# Patient Record
Sex: FEMALE | Race: WHITE | Employment: FULL TIME | URBAN - NONMETROPOLITAN AREA
[De-identification: names, ages, dates, MRNs, and addresses within clinical notes are randomized per-mention and may not be internally consistent; named-entity substitution may affect disease eponyms.]

---

## 2018-04-12 ENCOUNTER — HOSPITAL ENCOUNTER (OUTPATIENT)
Dept: WOMENS IMAGING | Age: 48
Discharge: HOME | End: 2018-04-12
Payer: COMMERCIAL

## 2018-04-12 DIAGNOSIS — Z12.31 VISIT FOR SCREENING MAMMOGRAM: ICD-10-CM

## 2018-04-12 PROCEDURE — 77063 BREAST TOMOSYNTHESIS BI: CPT

## 2018-07-12 ENCOUNTER — OFFICE VISIT (OUTPATIENT)
Dept: INTERNAL MEDICINE CLINIC | Age: 48
End: 2018-07-12
Payer: COMMERCIAL

## 2018-07-12 VITALS
HEIGHT: 67 IN | BODY MASS INDEX: 45.83 KG/M2 | SYSTOLIC BLOOD PRESSURE: 118 MMHG | DIASTOLIC BLOOD PRESSURE: 74 MMHG | HEART RATE: 76 BPM | WEIGHT: 292 LBS

## 2018-07-12 DIAGNOSIS — E78.5 HYPERLIPIDEMIA, UNSPECIFIED HYPERLIPIDEMIA TYPE: ICD-10-CM

## 2018-07-12 DIAGNOSIS — E11.9 TYPE 2 DIABETES MELLITUS WITHOUT COMPLICATION, WITHOUT LONG-TERM CURRENT USE OF INSULIN (HCC): Primary | ICD-10-CM

## 2018-07-12 DIAGNOSIS — E03.8 OTHER SPECIFIED HYPOTHYROIDISM: ICD-10-CM

## 2018-07-12 DIAGNOSIS — E66.01 MORBID OBESITY WITH BMI OF 45.0-49.9, ADULT (HCC): ICD-10-CM

## 2018-07-12 DIAGNOSIS — K21.9 GASTROESOPHAGEAL REFLUX DISEASE, ESOPHAGITIS PRESENCE NOT SPECIFIED: ICD-10-CM

## 2018-07-12 DIAGNOSIS — G47.33 OBSTRUCTIVE SLEEP APNEA: ICD-10-CM

## 2018-07-12 PROCEDURE — 99204 OFFICE O/P NEW MOD 45 MIN: CPT | Performed by: INTERNAL MEDICINE

## 2018-07-12 RX ORDER — TOPIRAMATE 50 MG/1
50 TABLET, FILM COATED ORAL NIGHTLY
COMMUNITY
End: 2018-10-04 | Stop reason: SDUPTHER

## 2018-07-12 RX ORDER — CYCLOBENZAPRINE HCL 10 MG
10 TABLET ORAL 3 TIMES DAILY PRN
COMMUNITY
End: 2019-04-02

## 2018-07-12 RX ORDER — DOCUSATE SODIUM 100 MG/1
100 CAPSULE, LIQUID FILLED ORAL DAILY
COMMUNITY
End: 2019-01-03

## 2018-07-12 RX ORDER — GLIPIZIDE 5 MG/1
5 TABLET ORAL
Qty: 180 TABLET | Refills: 1 | Status: CANCELLED | OUTPATIENT
Start: 2018-07-12

## 2018-07-12 RX ORDER — LEVOTHYROXINE SODIUM 0.15 MG/1
150 TABLET ORAL DAILY
COMMUNITY
End: 2018-09-10 | Stop reason: SDUPTHER

## 2018-07-12 ASSESSMENT — PATIENT HEALTH QUESTIONNAIRE - PHQ9
1. LITTLE INTEREST OR PLEASURE IN DOING THINGS: 0
SUM OF ALL RESPONSES TO PHQ QUESTIONS 1-9: 0
2. FEELING DOWN, DEPRESSED OR HOPELESS: 0
SUM OF ALL RESPONSES TO PHQ9 QUESTIONS 1 & 2: 0

## 2018-07-18 ENCOUNTER — TELEPHONE (OUTPATIENT)
Dept: INTERNAL MEDICINE CLINIC | Age: 48
End: 2018-07-18

## 2018-07-18 NOTE — TELEPHONE ENCOUNTER
Pt called saying she is not feeling well since taking metformin. Has terrible nausea and diarrhea. She travels for her job. Wants to go back on glipzide. Also sent you a script to authorized for a new meter and test strips.

## 2018-07-19 RX ORDER — GLUCOSAMINE HCL/CHONDROITIN SU 500-400 MG
CAPSULE ORAL
Qty: 100 STRIP | Refills: 1 | Status: SHIPPED | OUTPATIENT
Start: 2018-07-19 | End: 2019-04-04 | Stop reason: SDUPTHER

## 2018-07-19 NOTE — TELEPHONE ENCOUNTER
Also - when restarting Glipizide - only take one pill with breakfast - stop the one at supper to avoid hypoglycemia.

## 2018-07-19 NOTE — TELEPHONE ENCOUNTER
Go back on Glipizide - resolve the diarrhea. Then when back to normal would she consider trying Metformin  mg daily as this doesn't have as much diarrhea side effect?   She needs to call with any FSBS <80, as hypoglycemia was and issue with Glipizide in past.

## 2018-07-20 RX ORDER — GLIPIZIDE 5 MG/1
5 TABLET ORAL 2 TIMES DAILY
Qty: 60 TABLET | Refills: 3 | Status: SHIPPED | OUTPATIENT
Start: 2018-07-20 | End: 2018-07-22

## 2018-07-22 RX ORDER — GLIPIZIDE 5 MG/1
5 TABLET ORAL DAILY
Qty: 30 TABLET | Refills: 3 | Status: SHIPPED | OUTPATIENT
Start: 2018-07-22 | End: 2018-10-04 | Stop reason: SDUPTHER

## 2018-09-10 RX ORDER — LEVOTHYROXINE SODIUM 0.15 MG/1
150 TABLET ORAL DAILY
Qty: 90 TABLET | Refills: 1 | Status: SHIPPED | OUTPATIENT
Start: 2018-09-10 | End: 2019-01-03 | Stop reason: SDUPTHER

## 2018-10-04 ENCOUNTER — OFFICE VISIT (OUTPATIENT)
Dept: INTERNAL MEDICINE CLINIC | Age: 48
End: 2018-10-04
Payer: COMMERCIAL

## 2018-10-04 VITALS
HEIGHT: 67 IN | BODY MASS INDEX: 45.99 KG/M2 | DIASTOLIC BLOOD PRESSURE: 76 MMHG | SYSTOLIC BLOOD PRESSURE: 124 MMHG | HEART RATE: 94 BPM | WEIGHT: 293 LBS | RESPIRATION RATE: 16 BRPM

## 2018-10-04 DIAGNOSIS — M79.7 FIBROMYALGIA: ICD-10-CM

## 2018-10-04 DIAGNOSIS — F41.9 ANXIETY: ICD-10-CM

## 2018-10-04 DIAGNOSIS — Z23 NEED FOR INFLUENZA VACCINATION: ICD-10-CM

## 2018-10-04 DIAGNOSIS — E11.9 DIABETES MELLITUS WITHOUT COMPLICATION (HCC): Primary | ICD-10-CM

## 2018-10-04 DIAGNOSIS — E78.5 HYPERLIPIDEMIA, UNSPECIFIED HYPERLIPIDEMIA TYPE: ICD-10-CM

## 2018-10-04 DIAGNOSIS — E03.8 OTHER SPECIFIED HYPOTHYROIDISM: ICD-10-CM

## 2018-10-04 DIAGNOSIS — R30.0 DYSURIA: ICD-10-CM

## 2018-10-04 DIAGNOSIS — E11.9 TYPE 2 DIABETES MELLITUS WITHOUT COMPLICATION, WITHOUT LONG-TERM CURRENT USE OF INSULIN (HCC): ICD-10-CM

## 2018-10-04 DIAGNOSIS — Z23 NEED FOR PNEUMOCOCCAL VACCINE: ICD-10-CM

## 2018-10-04 LAB — HBA1C MFR BLD: 6.1 % (ref 4.3–5.7)

## 2018-10-04 PROCEDURE — 83036 HEMOGLOBIN GLYCOSYLATED A1C: CPT | Performed by: INTERNAL MEDICINE

## 2018-10-04 PROCEDURE — 90471 IMMUNIZATION ADMIN: CPT | Performed by: INTERNAL MEDICINE

## 2018-10-04 PROCEDURE — 99214 OFFICE O/P EST MOD 30 MIN: CPT | Performed by: INTERNAL MEDICINE

## 2018-10-04 PROCEDURE — 90472 IMMUNIZATION ADMIN EACH ADD: CPT | Performed by: INTERNAL MEDICINE

## 2018-10-04 PROCEDURE — 90686 IIV4 VACC NO PRSV 0.5 ML IM: CPT | Performed by: INTERNAL MEDICINE

## 2018-10-04 PROCEDURE — 90670 PCV13 VACCINE IM: CPT | Performed by: INTERNAL MEDICINE

## 2018-10-04 RX ORDER — GLIPIZIDE 5 MG/1
5 TABLET ORAL DAILY
Qty: 90 TABLET | Refills: 1 | Status: SHIPPED | OUTPATIENT
Start: 2018-10-04 | End: 2019-01-03 | Stop reason: SDUPTHER

## 2018-10-04 RX ORDER — HYDROXYZINE PAMOATE 25 MG/1
25 CAPSULE ORAL 3 TIMES DAILY PRN
Qty: 90 CAPSULE | Refills: 2 | Status: SHIPPED | OUTPATIENT
Start: 2018-10-04 | End: 2019-04-04 | Stop reason: SDUPTHER

## 2018-10-04 RX ORDER — DULOXETIN HYDROCHLORIDE 60 MG/1
60 CAPSULE, DELAYED RELEASE ORAL NIGHTLY
Qty: 90 CAPSULE | Refills: 1 | Status: SHIPPED | OUTPATIENT
Start: 2018-10-04 | End: 2019-01-03 | Stop reason: SDUPTHER

## 2018-10-04 RX ORDER — TRAZODONE HYDROCHLORIDE 150 MG/1
150 TABLET ORAL NIGHTLY
Qty: 90 TABLET | Refills: 1 | Status: SHIPPED | OUTPATIENT
Start: 2018-10-04 | End: 2019-01-03 | Stop reason: SDUPTHER

## 2018-10-04 RX ORDER — TOPIRAMATE 50 MG/1
50 TABLET, FILM COATED ORAL NIGHTLY
Qty: 90 TABLET | Refills: 1 | Status: SHIPPED | OUTPATIENT
Start: 2018-10-04 | End: 2019-01-03 | Stop reason: SDUPTHER

## 2018-10-04 NOTE — PROGRESS NOTES
 Chronic back pain     Chronic sinus infection     Constipation     Fibromyalgia     GERD (gastroesophageal reflux disease)     H/O cold sores     acyclovir prn    Hyperlipidemia     Hypothyroid     thyroiditis    Insomnia     Kidney stones     Malabsorption     really just gastric sleeve    Obesity     Obsessive compulsive disorder     Prolonged emergence from general anesthesia     Restless leg syndrome     Sleep apnea     wears CPAP every night - Dr. Daryl Levine incontinence     Type II or unspecified type diabetes mellitus without mention of complication, not stated as uncontrolled     Vitamin D deficiency        Past Surgical History:   Procedure Laterality Date    CARPAL TUNNEL RELEASE Bilateral 2001     SECTION  2005    GASTRIC RESTRICTION SURGERY  2016    gastric sleeve    KIDNEY STONE SURGERY  2000    lithotripsy    KNEE ARTHROSCOPY Left 2018    Dr. Marielos Garcia  2014    SHOULDER SURGERY Right 2009    SPINAL FUSION  2011    L4-S1 fusion - [facility]    UPPER GASTROINTESTINAL ENDOSCOPY  2016    VAGINAL PROLAPSE REPAIR      RECTAL PROLAPSE       Current Outpatient Prescriptions   Medication Sig Dispense Refill    glipiZIDE (GLUCOTROL) 5 MG tablet Take 1 tablet by mouth daily 90 tablet 1    exenatide (BYETTA) 10 MCG/0.04ML injection Inject 0.04 mLs into the skin 2 times daily (with meals) 2.4 mL 1    traZODone (DESYREL) 150 MG tablet Take 1 tablet by mouth nightly 90 tablet 1    DULoxetine (CYMBALTA) 60 MG extended release capsule Take 1 capsule by mouth nightly 90 capsule 1    hydrOXYzine (VISTARIL) 25 MG capsule Take 1 capsule by mouth 3 times daily as needed for Itching 90 capsule 2    topiramate (TOPAMAX) 50 MG tablet Take 1 tablet by mouth nightly 90 tablet 1    levothyroxine (SYNTHROID) 150 MCG tablet Take 1 tablet by mouth Daily 90 tablet 1    blood glucose monitor kit

## 2018-10-15 ENCOUNTER — TELEPHONE (OUTPATIENT)
Dept: INTERNAL MEDICINE CLINIC | Age: 48
End: 2018-10-15

## 2018-10-15 NOTE — TELEPHONE ENCOUNTER
----- Message from Jeanette Casiano MD sent at 10/10/2018  4:24 PM EDT -----  Let her know UA negative for infection.

## 2018-12-09 DIAGNOSIS — E11.9 TYPE 2 DIABETES MELLITUS WITHOUT COMPLICATION, WITHOUT LONG-TERM CURRENT USE OF INSULIN (HCC): ICD-10-CM

## 2018-12-10 ENCOUNTER — HOSPITAL ENCOUNTER (EMERGENCY)
Age: 48
Discharge: HOME | End: 2018-12-10
Payer: COMMERCIAL

## 2018-12-10 VITALS
HEART RATE: 83 BPM | WEIGHT: 293 LBS | HEIGHT: 67 IN | BODY MASS INDEX: 45.99 KG/M2 | TEMPERATURE: 97.4 F | DIASTOLIC BLOOD PRESSURE: 60 MMHG | SYSTOLIC BLOOD PRESSURE: 123 MMHG | RESPIRATION RATE: 16 BRPM | OXYGEN SATURATION: 97 %

## 2018-12-10 DIAGNOSIS — Z76.0 ENCOUNTER FOR MEDICATION REFILL: ICD-10-CM

## 2018-12-10 DIAGNOSIS — B00.1 COLD SORE: ICD-10-CM

## 2018-12-10 DIAGNOSIS — R30.0 DYSURIA: ICD-10-CM

## 2018-12-10 DIAGNOSIS — J06.9 ACUTE UPPER RESPIRATORY INFECTION: Primary | ICD-10-CM

## 2018-12-10 LAB
BILIRUBIN URINE: NEGATIVE
BLOOD, URINE: NEGATIVE
CHARACTER, URINE: CLEAR
COLOR: YELLOW
GLUCOSE, URINE: NEGATIVE MG/DL
KETONES, URINE: NEGATIVE
LEUKOCYTES, UA: NEGATIVE
NITRATE, UA: NEGATIVE
PH UA: 7.5 (ref 5–9)
PROTEIN UA: NEGATIVE MG/DL
REFLEX TO URINE C & S: NORMAL
SPECIFIC GRAVITY UA: 1.01 (ref 1–1.03)
UROBILINOGEN, URINE: 0.2 EU/DL (ref 0–1)

## 2018-12-10 PROCEDURE — 99214 OFFICE O/P EST MOD 30 MIN: CPT | Performed by: NURSE PRACTITIONER

## 2018-12-10 PROCEDURE — 99213 OFFICE O/P EST LOW 20 MIN: CPT

## 2018-12-10 PROCEDURE — 81003 URINALYSIS AUTO W/O SCOPE: CPT

## 2018-12-10 RX ORDER — METRONIDAZOLE 500 MG/1
500 TABLET ORAL 3 TIMES DAILY
COMMUNITY
End: 2019-01-03 | Stop reason: ALTCHOICE

## 2018-12-10 RX ORDER — ACYCLOVIR 400 MG/1
400 TABLET ORAL 3 TIMES DAILY
Qty: 21 TABLET | Refills: 0 | Status: SHIPPED | OUTPATIENT
Start: 2018-12-10 | End: 2018-12-17

## 2018-12-10 RX ORDER — AMOXICILLIN AND CLAVULANATE POTASSIUM 875; 125 MG/1; MG/1
1 TABLET, FILM COATED ORAL 2 TIMES DAILY
Qty: 20 TABLET | Refills: 0 | Status: SHIPPED | OUTPATIENT
Start: 2018-12-10 | End: 2018-12-20

## 2018-12-10 ASSESSMENT — ENCOUNTER SYMPTOMS
SORE THROAT: 1
ABDOMINAL PAIN: 0
VOMITING: 0
RHINORRHEA: 1
SINUS PRESSURE: 1
SINUS PAIN: 1
NAUSEA: 0
DIARRHEA: 0
COUGH: 1
EYE ITCHING: 0
EYE REDNESS: 0

## 2018-12-10 ASSESSMENT — PAIN DESCRIPTION - LOCATION: LOCATION: HEAD

## 2018-12-10 ASSESSMENT — PAIN DESCRIPTION - DESCRIPTORS: DESCRIPTORS: ACHING

## 2018-12-10 ASSESSMENT — PAIN DESCRIPTION - PAIN TYPE: TYPE: ACUTE PAIN

## 2018-12-10 ASSESSMENT — PAIN SCALES - GENERAL: PAINLEVEL_OUTOF10: 6

## 2018-12-10 ASSESSMENT — PAIN DESCRIPTION - FREQUENCY: FREQUENCY: INTERMITTENT

## 2018-12-12 RX ORDER — EXENATIDE 250 UG/ML
INJECTION SUBCUTANEOUS
Qty: 2.4 ML | Refills: 1 | Status: SHIPPED | OUTPATIENT
Start: 2018-12-12 | End: 2019-01-03 | Stop reason: SDUPTHER

## 2018-12-31 LAB
AVERAGE GLUCOSE: NORMAL
HBA1C MFR BLD: 6.4 %
LDL CHOLESTEROL DIRECT: 131 MG/DL

## 2019-01-03 ENCOUNTER — OFFICE VISIT (OUTPATIENT)
Dept: INTERNAL MEDICINE CLINIC | Age: 49
End: 2019-01-03
Payer: COMMERCIAL

## 2019-01-03 VITALS
HEART RATE: 72 BPM | HEIGHT: 67 IN | SYSTOLIC BLOOD PRESSURE: 106 MMHG | BODY MASS INDEX: 45.99 KG/M2 | WEIGHT: 293 LBS | DIASTOLIC BLOOD PRESSURE: 70 MMHG

## 2019-01-03 DIAGNOSIS — F41.9 ANXIETY: ICD-10-CM

## 2019-01-03 DIAGNOSIS — E11.9 TYPE 2 DIABETES MELLITUS WITHOUT COMPLICATION, WITHOUT LONG-TERM CURRENT USE OF INSULIN (HCC): Primary | ICD-10-CM

## 2019-01-03 DIAGNOSIS — F42.4 HABITUAL SELF-EXCORIATION: ICD-10-CM

## 2019-01-03 DIAGNOSIS — M79.7 FIBROMYALGIA: ICD-10-CM

## 2019-01-03 DIAGNOSIS — E03.9 ACQUIRED HYPOTHYROIDISM: ICD-10-CM

## 2019-01-03 DIAGNOSIS — K21.9 GASTROESOPHAGEAL REFLUX DISEASE, ESOPHAGITIS PRESENCE NOT SPECIFIED: ICD-10-CM

## 2019-01-03 DIAGNOSIS — E78.00 PURE HYPERCHOLESTEROLEMIA: ICD-10-CM

## 2019-01-03 PROCEDURE — 99214 OFFICE O/P EST MOD 30 MIN: CPT | Performed by: INTERNAL MEDICINE

## 2019-01-03 RX ORDER — DULOXETIN HYDROCHLORIDE 60 MG/1
60 CAPSULE, DELAYED RELEASE ORAL NIGHTLY
Qty: 90 CAPSULE | Refills: 1 | Status: SHIPPED | OUTPATIENT
Start: 2019-01-03 | End: 2019-04-04 | Stop reason: SDUPTHER

## 2019-01-03 RX ORDER — TOPIRAMATE 50 MG/1
50 TABLET, FILM COATED ORAL NIGHTLY
Qty: 90 TABLET | Refills: 1 | Status: SHIPPED | OUTPATIENT
Start: 2019-01-03 | End: 2019-04-04 | Stop reason: SDUPTHER

## 2019-01-03 RX ORDER — DEXLANSOPRAZOLE 60 MG/1
60 CAPSULE, DELAYED RELEASE ORAL DAILY
Qty: 90 CAPSULE | Refills: 1 | Status: SHIPPED | OUTPATIENT
Start: 2019-01-03 | End: 2019-04-04 | Stop reason: SDUPTHER

## 2019-01-03 RX ORDER — TRAZODONE HYDROCHLORIDE 150 MG/1
150 TABLET ORAL NIGHTLY
Qty: 90 TABLET | Refills: 1 | Status: SHIPPED | OUTPATIENT
Start: 2019-01-03 | End: 2019-04-04 | Stop reason: SDUPTHER

## 2019-01-03 RX ORDER — ATORVASTATIN CALCIUM 20 MG/1
20 TABLET, FILM COATED ORAL DAILY
Qty: 90 TABLET | Refills: 1 | Status: SHIPPED | OUTPATIENT
Start: 2019-01-03 | End: 2019-04-04 | Stop reason: SDUPTHER

## 2019-01-03 RX ORDER — GLIPIZIDE 5 MG/1
5 TABLET ORAL
Qty: 180 TABLET | Refills: 1 | Status: SHIPPED | OUTPATIENT
Start: 2019-01-03 | End: 2019-04-04 | Stop reason: SDUPTHER

## 2019-01-03 RX ORDER — LEVOTHYROXINE SODIUM 0.15 MG/1
150 TABLET ORAL DAILY
Qty: 90 TABLET | Refills: 1 | Status: SHIPPED | OUTPATIENT
Start: 2019-01-03 | End: 2019-04-04 | Stop reason: SDUPTHER

## 2019-04-01 LAB — LDL CHOLESTEROL DIRECT: 71 MG/DL

## 2019-04-02 ENCOUNTER — HOSPITAL ENCOUNTER (EMERGENCY)
Age: 49
Discharge: HOME | End: 2019-04-02
Payer: COMMERCIAL

## 2019-04-02 VITALS
HEART RATE: 90 BPM | DIASTOLIC BLOOD PRESSURE: 58 MMHG | HEIGHT: 67 IN | WEIGHT: 293 LBS | BODY MASS INDEX: 45.99 KG/M2 | TEMPERATURE: 97.4 F | RESPIRATION RATE: 16 BRPM | SYSTOLIC BLOOD PRESSURE: 115 MMHG | OXYGEN SATURATION: 96 %

## 2019-04-02 DIAGNOSIS — L03.319 CELLULITIS AND ABSCESS OF TRUNK: ICD-10-CM

## 2019-04-02 DIAGNOSIS — B96.89 ACUTE BACTERIAL SINUSITIS: Primary | ICD-10-CM

## 2019-04-02 DIAGNOSIS — J01.90 ACUTE BACTERIAL SINUSITIS: Primary | ICD-10-CM

## 2019-04-02 DIAGNOSIS — L02.219 CELLULITIS AND ABSCESS OF TRUNK: ICD-10-CM

## 2019-04-02 PROCEDURE — 99212 OFFICE O/P EST SF 10 MIN: CPT

## 2019-04-02 PROCEDURE — 99214 OFFICE O/P EST MOD 30 MIN: CPT | Performed by: NURSE PRACTITIONER

## 2019-04-02 RX ORDER — SULFAMETHOXAZOLE AND TRIMETHOPRIM 800; 160 MG/1; MG/1
1 TABLET ORAL 2 TIMES DAILY
Qty: 20 TABLET | Refills: 0 | Status: SHIPPED | OUTPATIENT
Start: 2019-04-02 | End: 2019-04-12

## 2019-04-02 ASSESSMENT — ENCOUNTER SYMPTOMS
NAUSEA: 0
CHEST TIGHTNESS: 0
COUGH: 1
ALLERGIC/IMMUNOLOGIC NEGATIVE: 1
SORE THROAT: 0
SINUS PAIN: 1
RHINORRHEA: 1
DIARRHEA: 0
SHORTNESS OF BREATH: 0
SINUS PRESSURE: 1
VOMITING: 0
ABDOMINAL PAIN: 0

## 2019-04-02 NOTE — ED TRIAGE NOTES
Pt to [facility] ambulatory with  with URI s/s, cough, and sinus pressure. This started on Saturday. No fevers.

## 2019-04-02 NOTE — DISCHARGE INSTR - COC
Gastroesophageal reflux disease K21.9    Obstructive sleep apnea G47.33    Diabetes mellitus without complication ([facility]) T76.2       Isolation/Infection:   Isolation          No Isolation            Nurse Assessment:  Last Vital Signs: There were no vitals taken for this visit. Last documented pain score (0-10 scale):    Last Weight:   Wt Readings from Last 1 Encounters:   19 295 lb (133.8 kg)     Mental Status:  {IP PT MENTAL STATUS:56910}    IV Access:  { HENRIK IV ACCESS:996216120}    Nursing Mobility/ADLs:  Walking   {CHP DME DTMJ:604395961}  Transfer  {CHP DME ZWCP:691488035}  Bathing  {CHP DME ONTC:652318108}  Dressing  {CHP DME RNTP:798249693}  Toileting  {CHP DME NDUP:650386467}  Feeding  {CHP DME CNQD:465318801}  Med Admin  {CHP DME VPLF:268889676}  Med Delivery   { HENRIK MED Delivery:725265987}    Wound Care Documentation and Therapy:  Incision 16 Abdomen (Active)   Number of days: 1022        Elimination:  Continence:   · Bowel: {YES / VC:98360}  · Bladder: {YES / B}  Urinary Catheter: {Urinary Catheter:535907654}   Colostomy/Ileostomy/Ileal Conduit: {YES / EH:05781}       Date of Last BM: ***  No intake or output data in the 24 hours ending 19 0808  No intake/output data recorded.     Safety Concerns:     [address] Safety Concerns:992290499}    Impairments/Disabilities:      [address] Impairments/Disabilities:436354939}    Nutrition Therapy:  Current Nutrition Therapy:   [address] Diet List:908707761}    Routes of Feeding: {P DME Other Feedings:542011057}  Liquids: {Slp liquid thickness:59185}  Daily Fluid Restriction: {CHP DME Yes amt example:868399042}  Last Modified Barium Swallow with Video (Video Swallowing Test): {Done Not Done PPOS:317015001}    Treatments at the Time of Hospital Discharge:   Respiratory Treatments: ***  Oxygen Therapy:  {Therapy; copd oxygen:74697}  Ventilator:    { CC Vent XLNW:725024585}    Rehab Therapies: {THERAPEUTIC INTERVENTION:1024211580}  Weight Bearing Status/Restrictions: [address] CC Weight Bearin}  Other Medical Equipment (for information only, NOT a DME order):  {EQUIPMENT:550846364}  Other Treatments: ***    Patient's personal belongings (please select all that are sent with patient):  {CHP DME Belongings:605547635}    RN SIGNATURE:  {Esignature:449616066}    CASE MANAGEMENT/SOCIAL WORK SECTION    Inpatient Status Date: ***    Readmission Risk Assessment Score:  Readmission Risk              Risk of Unplanned Readmission:        0           Discharging to Facility/ Agency   · Name:   · Address:  · Phone:  · Fax:    Dialysis Facility (if applicable)   · Name:  · Address:  · Dialysis Schedule:  · Phone:  · Fax:    / signature: {Esignature:731762985}    PHYSICIAN SECTION    Prognosis: {Prognosis:9444509551}    Condition at Discharge: [address] Patient Condition:975544521}    Rehab Potential (if transferring to Rehab): {Prognosis:4152006325}    Recommended Labs or Other Treatments After Discharge: ***    Physician Certification: I certify the above information and transfer of Jarred Andrade  is necessary for the continuing treatment of the diagnosis listed and that she requires {Admit to Appropriate Level of Care:69635} for {GREATER/LESS:472012916} 30 days.      Update Admission H&P: {CHP DME Changes in SMTNS:001560565}    PHYSICIAN SIGNATURE:  {Esignature:108727224}

## 2019-04-02 NOTE — ED PROVIDER NOTES
[address]       Chief Complaint   Patient presents with    Sinusitis    Cough    URI       Nurses Notes reviewed and I agree except as noted in the HPI. HISTORY OF PRESENT ILLNESS   Lang Portillo is a 46 y.o. female who presents for evaluation of sinus congestion, post nasal drip, and cough that started over the weekend. She has taken equal for her symptoms with no improvement. Reports that if not treated she gets bad really fast.  In addition, she states that she is a \"\" and is concerned about a sore on her belly. Denies a history of MRSA. REVIEW OF SYSTEMS     Review of Systems   Constitutional: Negative for chills, fatigue and fever. HENT: Positive for congestion, postnasal drip, rhinorrhea, sinus pressure and sinus pain. Negative for ear pain and sore throat. Respiratory: Positive for cough. Negative for chest tightness and shortness of breath. Cardiovascular: Negative for chest pain. Gastrointestinal: Negative for abdominal pain, diarrhea, nausea and vomiting. Skin: Positive for wound. Negative for rash. Allergic/Immunologic: Negative. Neurological: Negative for headaches.        PAST MEDICAL HISTORY         Diagnosis Date    Anxiety     Chronic back pain     Chronic sinus infection     Constipation     Fibromyalgia     GERD (gastroesophageal reflux disease)     H/O cold sores     acyclovir prn    Hyperlipidemia     Hypothyroid     thyroiditis    Insomnia     Kidney stones 2000    Malabsorption     really just gastric sleeve    Obesity     Obsessive compulsive disorder     Prolonged emergence from general anesthesia     Restless leg syndrome     Sleep apnea     wears CPAP every night - Dr. Onelia Lorenz incontinence     Type II or unspecified type diabetes mellitus without mention of complication, not stated as uncontrolled     Vitamin D deficiency        SURGICAL HISTORY     Patient has a past surgical history that includes Tonsillectomy (); Kidney stone surgery (); Carpal tunnel release (Bilateral, );  section (); shoulder surgery (Right, ); Spinal fusion (); Vaginal prolapse repair (); Gastric restriction surgery (2016); LEEP (); Knee arthroscopy (Left, 2018); Nasal septum surgery (); and Upper gastrointestinal endoscopy (2016). CURRENT MEDICATIONS       Previous Medications    ATORVASTATIN (LIPITOR) 20 MG TABLET    Take 1 tablet by mouth daily    BLOOD GLUCOSE MONITOR KIT AND SUPPLIES    Test one  times a day & as needed for symptoms of irregular blood glucose. Dx: E11.9 One Touch Ultra 2 glucose meter    BLOOD GLUCOSE MONITOR STRIPS    One Touch Ultra 2  Glucose test strips.    Check sugars one time a day and as need for irregular blood glucose Dx: E11.9    DEXLANSOPRAZOLE (DEXILANT) 60 MG CPDR DELAYED RELEASE CAPSULE    Take 1 capsule by mouth daily    DULOXETINE (CYMBALTA) 60 MG EXTENDED RELEASE CAPSULE    Take 1 capsule by mouth nightly    EXENATIDE (BYETTA 10 MCG PEN) 10 MCG/0.04ML INJECTION    ADMINISTER 10 MCG UNDER THE SKIN TWICE DAILY WITH MEALS    FLUTICASONE (FLONASE) 50 MCG/ACT NASAL SPRAY    2 sprays by Nasal route daily Apply daily to each nare    GLIPIZIDE (GLUCOTROL) 5 MG TABLET    Take 1 tablet by mouth 2 times daily (before meals)    HYDROXYZINE (VISTARIL) 25 MG CAPSULE    Take 1 capsule by mouth 3 times daily as needed for Itching    INSULIN PEN NEEDLE 29G X 12MM MISC    Use 1 pen needle 2 times daily with insulin    LEVOTHYROXINE (SYNTHROID) 150 MCG TABLET    Take 1 tablet by mouth Daily    LORATADINE (CLARITIN) 10 MG TABLET    Take 1 tablet by mouth daily    TOPIRAMATE (TOPAMAX) 50 MG TABLET    Take 1 tablet by mouth nightly    TRAZODONE (DESYREL) 150 MG TABLET    Take 1 tablet by mouth nightly       ALLERGIES     Patient is is allergic to actos [pioglitazone]; dilaudid [hydromorphone hcl]; morphine; other; and percocet [oxycodone-acetaminophen]. FAMILY HISTORY     Patient'sfamily history includes Breast Cancer in her paternal grandmother; COPD in her mother; Diabetes in her brother, father, mother, and sister; Heart Attack in her brother and father; Heart Disease in her mother; High Blood Pressure in her brother, mother, and sister; Obesity in her brother, mother, and sister; Other in her sister. SOCIAL HISTORY     Patient  reports that she has been smoking cigarettes. She started smoking about 31 years ago. She has a 4.75 pack-year smoking history. She has quit using smokeless tobacco. She reports that she drinks alcohol. She reports that she does not use drugs. PHYSICAL EXAM     ED TRIAGE VITALS  BP: (!) 115/58, Temp: 97.4 °F (36.3 °C), Pulse: 90,  , SpO2: 96 %  Physical Exam   Constitutional: She is oriented to person, place, and time. Vital signs are normal. She appears well-developed and well-nourished. She is cooperative. No distress. HENT:   Head: Normocephalic and atraumatic. Right Ear: Hearing, tympanic membrane, external ear and ear canal normal.   Left Ear: Hearing, tympanic membrane, external ear and ear canal normal.   Nose: Mucosal edema (L nare) and rhinorrhea present. Right sinus exhibits frontal sinus tenderness. Right sinus exhibits no maxillary sinus tenderness. Left sinus exhibits frontal sinus tenderness. Left sinus exhibits no maxillary sinus tenderness. Mouth/Throat: Uvula is midline and mucous membranes are normal. Posterior oropharyngeal erythema (PND) present. Eyes: Conjunctivae are normal.   Neck: Normal range of motion and full passive range of motion without pain. Cardiovascular: Normal rate and normal heart sounds. Pulmonary/Chest: Effort normal and breath sounds normal.   Lymphadenopathy:     She has no cervical adenopathy. Neurological: She is alert and oriented to person, place, and time. Skin: Skin is warm and dry. No rash (on exposed surfaces) noted.

## 2019-04-03 ENCOUNTER — TELEPHONE (OUTPATIENT)
Dept: INTERNAL MEDICINE CLINIC | Age: 49
End: 2019-04-03

## 2019-04-04 ENCOUNTER — OFFICE VISIT (OUTPATIENT)
Dept: INTERNAL MEDICINE CLINIC | Age: 49
End: 2019-04-04
Payer: COMMERCIAL

## 2019-04-04 VITALS
HEART RATE: 74 BPM | DIASTOLIC BLOOD PRESSURE: 64 MMHG | WEIGHT: 292.5 LBS | SYSTOLIC BLOOD PRESSURE: 112 MMHG | BODY MASS INDEX: 45.91 KG/M2 | HEIGHT: 67 IN

## 2019-04-04 DIAGNOSIS — E03.9 ACQUIRED HYPOTHYROIDISM: ICD-10-CM

## 2019-04-04 DIAGNOSIS — E11.9 TYPE 2 DIABETES MELLITUS WITHOUT COMPLICATION, WITHOUT LONG-TERM CURRENT USE OF INSULIN (HCC): ICD-10-CM

## 2019-04-04 DIAGNOSIS — F42.4 HABITUAL SELF-EXCORIATION: ICD-10-CM

## 2019-04-04 DIAGNOSIS — E78.00 PURE HYPERCHOLESTEROLEMIA: ICD-10-CM

## 2019-04-04 DIAGNOSIS — J01.00 ACUTE NON-RECURRENT MAXILLARY SINUSITIS: Primary | ICD-10-CM

## 2019-04-04 DIAGNOSIS — K21.9 GASTROESOPHAGEAL REFLUX DISEASE, ESOPHAGITIS PRESENCE NOT SPECIFIED: ICD-10-CM

## 2019-04-04 DIAGNOSIS — F41.9 ANXIETY: ICD-10-CM

## 2019-04-04 DIAGNOSIS — M79.7 FIBROMYALGIA: ICD-10-CM

## 2019-04-04 LAB — HBA1C MFR BLD: 6.4 % (ref 4.3–5.7)

## 2019-04-04 PROCEDURE — 99214 OFFICE O/P EST MOD 30 MIN: CPT | Performed by: INTERNAL MEDICINE

## 2019-04-04 PROCEDURE — 83036 HEMOGLOBIN GLYCOSYLATED A1C: CPT | Performed by: INTERNAL MEDICINE

## 2019-04-04 RX ORDER — FLUTICASONE PROPIONATE 50 MCG
2 SPRAY, SUSPENSION (ML) NASAL DAILY
Qty: 1 BOTTLE | Refills: 3 | Status: SHIPPED | OUTPATIENT
Start: 2019-04-04

## 2019-04-04 RX ORDER — TRAZODONE HYDROCHLORIDE 150 MG/1
150 TABLET ORAL NIGHTLY
Qty: 90 TABLET | Refills: 1 | Status: SHIPPED | OUTPATIENT
Start: 2019-04-04 | End: 2020-11-09 | Stop reason: SDUPTHER

## 2019-04-04 RX ORDER — TOPIRAMATE 50 MG/1
50 TABLET, FILM COATED ORAL NIGHTLY
Qty: 90 TABLET | Refills: 0 | Status: SHIPPED | OUTPATIENT
Start: 2019-04-04 | End: 2019-06-12

## 2019-04-04 RX ORDER — GLIPIZIDE 5 MG/1
5 TABLET ORAL
Qty: 180 TABLET | Refills: 1 | Status: SHIPPED | OUTPATIENT
Start: 2019-04-04 | End: 2021-01-04

## 2019-04-04 RX ORDER — DULOXETIN HYDROCHLORIDE 60 MG/1
60 CAPSULE, DELAYED RELEASE ORAL NIGHTLY
Qty: 90 CAPSULE | Refills: 1 | Status: SHIPPED | OUTPATIENT
Start: 2019-04-04 | End: 2019-06-12

## 2019-04-04 RX ORDER — GLUCOSAMINE HCL/CHONDROITIN SU 500-400 MG
CAPSULE ORAL
Qty: 100 STRIP | Refills: 1 | Status: SHIPPED | OUTPATIENT
Start: 2019-04-04

## 2019-04-04 RX ORDER — ATORVASTATIN CALCIUM 20 MG/1
20 TABLET, FILM COATED ORAL DAILY
Qty: 90 TABLET | Refills: 1 | Status: SHIPPED | OUTPATIENT
Start: 2019-04-04

## 2019-04-04 RX ORDER — LEVOTHYROXINE SODIUM 0.15 MG/1
150 TABLET ORAL DAILY
Qty: 90 TABLET | Refills: 1 | Status: SHIPPED | OUTPATIENT
Start: 2019-04-04

## 2019-04-04 RX ORDER — HYDROXYZINE PAMOATE 25 MG/1
25 CAPSULE ORAL 3 TIMES DAILY PRN
Qty: 90 CAPSULE | Refills: 2 | Status: SHIPPED | OUTPATIENT
Start: 2019-04-04 | End: 2019-06-12

## 2019-04-04 RX ORDER — AMOXICILLIN AND CLAVULANATE POTASSIUM 875; 125 MG/1; MG/1
1 TABLET, FILM COATED ORAL 2 TIMES DAILY
Qty: 28 TABLET | Refills: 0 | Status: SHIPPED | OUTPATIENT
Start: 2019-04-04 | End: 2019-04-18

## 2019-04-04 RX ORDER — DEXLANSOPRAZOLE 60 MG/1
60 CAPSULE, DELAYED RELEASE ORAL DAILY
Qty: 90 CAPSULE | Refills: 1 | Status: SHIPPED | OUTPATIENT
Start: 2019-04-04 | End: 2020-09-21

## 2019-04-04 ASSESSMENT — PATIENT HEALTH QUESTIONNAIRE - PHQ9
SUM OF ALL RESPONSES TO PHQ QUESTIONS 1-9: 0
2. FEELING DOWN, DEPRESSED OR HOPELESS: 0
1. LITTLE INTEREST OR PLEASURE IN DOING THINGS: 0
SUM OF ALL RESPONSES TO PHQ9 QUESTIONS 1 & 2: 0
SUM OF ALL RESPONSES TO PHQ QUESTIONS 1-9: 0
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
## 2019-04-19 ENCOUNTER — HOSPITAL ENCOUNTER (OUTPATIENT)
Dept: WOMENS IMAGING | Age: 49
Discharge: HOME | End: 2019-04-19
Payer: COMMERCIAL

## 2019-04-19 DIAGNOSIS — Z12.39 BREAST SCREENING: ICD-10-CM

## 2019-04-19 PROCEDURE — 77063 BREAST TOMOSYNTHESIS BI: CPT

## 2019-06-12 ENCOUNTER — OFFICE VISIT (OUTPATIENT)
Dept: INTERNAL MEDICINE CLINIC | Age: 49
End: 2019-06-12
Payer: MEDICARE

## 2019-06-12 DIAGNOSIS — F41.9 ANXIETY: ICD-10-CM

## 2019-06-12 DIAGNOSIS — F33.0 MILD EPISODE OF RECURRENT MAJOR DEPRESSIVE DISORDER (HCC): ICD-10-CM

## 2019-06-12 DIAGNOSIS — F42.4 SKIN-PICKING DISORDER: Primary | ICD-10-CM

## 2019-06-12 PROCEDURE — 90792 PSYCH DIAG EVAL W/MED SRVCS: CPT | Performed by: PSYCHIATRY & NEUROLOGY

## 2019-06-12 RX ORDER — DULOXETIN HYDROCHLORIDE 20 MG/1
CAPSULE, DELAYED RELEASE ORAL
Qty: 21 CAPSULE | Refills: 1 | Status: SHIPPED | OUTPATIENT
Start: 2019-06-12 | End: 2019-06-12 | Stop reason: SDUPTHER

## 2019-06-12 RX ORDER — FLUOXETINE 10 MG/1
CAPSULE ORAL
Qty: 12 CAPSULE | Refills: 1 | Status: SHIPPED | OUTPATIENT
Start: 2019-06-12 | End: 2019-06-12 | Stop reason: SDUPTHER

## 2019-06-12 RX ORDER — FLUOXETINE HYDROCHLORIDE 40 MG/1
40 CAPSULE ORAL DAILY
Qty: 30 CAPSULE | Refills: 3 | Status: SHIPPED | OUTPATIENT
Start: 2019-06-12 | End: 2019-06-12 | Stop reason: SDUPTHER

## 2019-06-12 NOTE — PROGRESS NOTES
Name:  Nishant Castorena    DOS:  6/12/2019    History of Present Illness:   45 yo  heterosexual employed  woman presents with complaint of skin picking. She has scars covering her legs and arms. She has been picking since childhood, she notes that when she starts she becomes preoccupied with the idea that she has to [facility] it smooth\". She has no other sx of OCD    She also c/o generalized anxiety for most of her adulthood, with chronic worry, claustrophobia, and occasional panic attacks    She c/o low mood, decreased energy, decreased libido and anhedonia. She sleeps well with trazodone. Appetite and concentration are ok, no SI. No hx erick    Medical problems:  DM  Fibromyalgia  hypothyroid    Collateral Information:   Chart review   Current Psychiatric Medications:   topiramate 50 mg  Duloxetine 60 mg   Trazodone 150 mg  Past Psychiatric History:   As above     Past Trauma History:   no     Risk Assessment:   No SI/HI      Family History of Psychiatric Illness:   Sister - OCD  Sons - skin picking   Social History:   Has  2 children 32, 24 from first marriage, 2 grand children  31 yo step son  15 yo son with current  Cora Quiet  Works for [employer] [place], [place], feeling lonely and isolated   Experience/ Status:   no   Spiritual Beliefs:   unknown           Mental Status Evaluation:   Appearance: Neatly groomed, appropriately dressed   Behavior: Cooperative and Appropriate   Eye Contact: Maintained good eye contact   Psychomotor Activity: Normal     Speech: Rate, volume, and prosody were normal   Mood: Depressed   Affect: Full range, appropriate, and mood congruent   Thought Process: Goal directed and coherent   Thought content: The thought content was appropriate to questions.  The patient denies any suicidal ideation or homicidal ideation   Perceptions: Perceptions were normal, the patient denied any auditory, tactile or visual

## 2019-06-12 NOTE — PATIENT INSTRUCTIONS
Stop topiramate    Begin fluoxetine (prozac)  10 mg for 4 days then  20 mg for 4 days then  40 mg daily    Duloxetine (cymbalta) taper  Wait 4 days.  On the day you start 20 mg fluoxetine begin duloxetine taper  Take 40 mg for 1 week then  20 mg for 1 week then stop

## 2019-07-10 ENCOUNTER — OFFICE VISIT (OUTPATIENT)
Dept: INTERNAL MEDICINE CLINIC | Age: 49
End: 2019-07-10
Payer: MEDICARE

## 2019-07-10 DIAGNOSIS — F42.4 SKIN-PICKING DISORDER: Primary | ICD-10-CM

## 2019-07-10 DIAGNOSIS — F32.A DEPRESSION, UNSPECIFIED DEPRESSION TYPE: ICD-10-CM

## 2019-07-10 DIAGNOSIS — F41.9 ANXIETY: ICD-10-CM

## 2019-07-10 DIAGNOSIS — G89.29 OTHER CHRONIC PAIN: ICD-10-CM

## 2019-07-10 PROCEDURE — 99214 OFFICE O/P EST MOD 30 MIN: CPT | Performed by: PSYCHIATRY & NEUROLOGY

## 2019-07-10 PROCEDURE — 90833 PSYTX W PT W E/M 30 MIN: CPT | Performed by: PSYCHIATRY & NEUROLOGY

## 2019-07-10 RX ORDER — FLUOXETINE HYDROCHLORIDE 40 MG/1
80 CAPSULE ORAL DAILY
Qty: 180 CAPSULE | Refills: 2 | Status: SHIPPED | OUTPATIENT
Start: 2019-07-10 | End: 2019-10-29 | Stop reason: SDUPTHER

## 2019-07-10 RX ORDER — FLUOXETINE HYDROCHLORIDE 20 MG/1
20 CAPSULE ORAL DAILY
Qty: 30 CAPSULE | Refills: 0 | Status: SHIPPED | OUTPATIENT
Start: 2019-07-10 | End: 2019-07-10 | Stop reason: SDUPTHER

## 2019-07-10 RX ORDER — GABAPENTIN 300 MG/1
300 CAPSULE ORAL NIGHTLY
Qty: 30 CAPSULE | Refills: 1 | Status: SHIPPED | OUTPATIENT
Start: 2019-07-10 | End: 2019-08-05

## 2019-07-10 RX ORDER — FLUOXETINE HYDROCHLORIDE 20 MG/1
20 CAPSULE ORAL DAILY
Qty: 90 CAPSULE | Refills: 0 | Status: SHIPPED | OUTPATIENT
Start: 2019-07-10 | End: 2019-08-05

## 2019-07-10 RX ORDER — FLUOXETINE HYDROCHLORIDE 40 MG/1
80 CAPSULE ORAL DAILY
Qty: 60 CAPSULE | Refills: 2 | Status: SHIPPED | OUTPATIENT
Start: 2019-07-10 | End: 2019-07-10 | Stop reason: SDUPTHER

## 2019-08-05 ENCOUNTER — OFFICE VISIT (OUTPATIENT)
Dept: INTERNAL MEDICINE CLINIC | Age: 49
End: 2019-08-05
Payer: MEDICARE

## 2019-08-05 DIAGNOSIS — G89.4 CHRONIC PAIN SYNDROME: ICD-10-CM

## 2019-08-05 DIAGNOSIS — F41.9 ANXIETY: Primary | ICD-10-CM

## 2019-08-05 DIAGNOSIS — F42.4 SKIN-PICKING DISORDER: ICD-10-CM

## 2019-08-05 PROCEDURE — 99214 OFFICE O/P EST MOD 30 MIN: CPT | Performed by: PSYCHIATRY & NEUROLOGY

## 2019-08-05 RX ORDER — GABAPENTIN 300 MG/1
300 CAPSULE ORAL 3 TIMES DAILY
Qty: 90 CAPSULE | Refills: 1 | Status: SHIPPED | OUTPATIENT
Start: 2019-08-05 | End: 2019-08-28
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
## 2019-08-28 ENCOUNTER — OFFICE VISIT (OUTPATIENT)
Dept: INTERNAL MEDICINE CLINIC | Age: 49
End: 2019-08-28
Payer: COMMERCIAL

## 2019-08-28 DIAGNOSIS — F41.9 ANXIETY: ICD-10-CM

## 2019-08-28 DIAGNOSIS — G89.4 CHRONIC PAIN SYNDROME: Primary | ICD-10-CM

## 2019-08-28 DIAGNOSIS — F42.4 SKIN-PICKING DISORDER: ICD-10-CM

## 2019-08-28 PROCEDURE — 99214 OFFICE O/P EST MOD 30 MIN: CPT | Performed by: PSYCHIATRY & NEUROLOGY

## 2019-08-28 PROCEDURE — G8428 CUR MEDS NOT DOCUMENT: HCPCS | Performed by: PSYCHIATRY & NEUROLOGY

## 2019-08-28 PROCEDURE — 4004F PT TOBACCO SCREEN RCVD TLK: CPT | Performed by: PSYCHIATRY & NEUROLOGY

## 2019-08-28 PROCEDURE — G8417 CALC BMI ABV UP PARAM F/U: HCPCS | Performed by: PSYCHIATRY & NEUROLOGY

## 2019-08-28 PROCEDURE — 3017F COLORECTAL CA SCREEN DOC REV: CPT | Performed by: PSYCHIATRY & NEUROLOGY

## 2019-08-28 RX ORDER — PREGABALIN 50 MG/1
100 CAPSULE ORAL EVERY EVENING
Qty: 60 CAPSULE | Refills: 1 | Status: SHIPPED | OUTPATIENT
Start: 2019-08-28 | End: 2019-10-23 | Stop reason: SDUPTHER

## 2019-10-23 DIAGNOSIS — G89.4 CHRONIC PAIN SYNDROME: ICD-10-CM

## 2019-10-23 RX ORDER — PREGABALIN 50 MG/1
CAPSULE ORAL
Qty: 60 CAPSULE | Refills: 1 | Status: SHIPPED | OUTPATIENT
Start: 2019-10-23 | End: 2019-10-29 | Stop reason: DRUGHIGH

## 2019-10-29 ENCOUNTER — OFFICE VISIT (OUTPATIENT)
Dept: INTERNAL MEDICINE CLINIC | Age: 49
End: 2019-10-29
Payer: MEDICARE

## 2019-10-29 DIAGNOSIS — F42.4 SKIN-PICKING DISORDER: Primary | ICD-10-CM

## 2019-10-29 DIAGNOSIS — G89.4 CHRONIC PAIN SYNDROME: ICD-10-CM

## 2019-10-29 DIAGNOSIS — F41.9 ANXIETY: ICD-10-CM

## 2019-10-29 PROCEDURE — 99214 OFFICE O/P EST MOD 30 MIN: CPT | Performed by: PSYCHIATRY & NEUROLOGY

## 2019-10-29 PROCEDURE — G8428 CUR MEDS NOT DOCUMENT: HCPCS | Performed by: PSYCHIATRY & NEUROLOGY

## 2019-10-29 PROCEDURE — 4004F PT TOBACCO SCREEN RCVD TLK: CPT | Performed by: PSYCHIATRY & NEUROLOGY

## 2019-10-29 PROCEDURE — G8417 CALC BMI ABV UP PARAM F/U: HCPCS | Performed by: PSYCHIATRY & NEUROLOGY

## 2019-10-29 PROCEDURE — G8484 FLU IMMUNIZE NO ADMIN: HCPCS | Performed by: PSYCHIATRY & NEUROLOGY

## 2019-10-29 PROCEDURE — 3017F COLORECTAL CA SCREEN DOC REV: CPT | Performed by: PSYCHIATRY & NEUROLOGY

## 2019-10-29 RX ORDER — FLUOXETINE HYDROCHLORIDE 40 MG/1
80 CAPSULE ORAL DAILY
Qty: 60 CAPSULE | Refills: 5 | Status: SHIPPED | OUTPATIENT
Start: 2019-10-29 | End: 2019-10-29 | Stop reason: SDUPTHER

## 2019-10-29 RX ORDER — PREGABALIN 100 MG/1
100 CAPSULE ORAL DAILY
Qty: 30 CAPSULE | Refills: 5 | Status: SHIPPED | OUTPATIENT
Start: 2019-10-29 | End: 2020-01-24 | Stop reason: SDUPTHER

## 2019-11-04 RX ORDER — FLUOXETINE HYDROCHLORIDE 40 MG/1
CAPSULE ORAL
Qty: 180 CAPSULE | Refills: 5 | Status: SHIPPED | OUTPATIENT
Start: 2019-11-04 | End: 2020-09-21

## 2020-01-27 RX ORDER — PREGABALIN 100 MG/1
100 CAPSULE ORAL DAILY
Qty: 90 CAPSULE | Refills: 0 | Status: SHIPPED | OUTPATIENT
Start: 2020-01-27 | End: 2020-05-20 | Stop reason: SDUPTHER

## 2020-05-20 ENCOUNTER — TELEPHONE (OUTPATIENT)
Dept: PSYCHIATRY | Age: 50
End: 2020-05-20

## 2020-05-20 RX ORDER — PREGABALIN 100 MG/1
100 CAPSULE ORAL DAILY
Qty: 90 CAPSULE | Refills: 0 | Status: SHIPPED | OUTPATIENT
Start: 2020-05-20 | End: 2020-08-18

## 2020-05-20 NOTE — TELEPHONE ENCOUNTER
Danny Arredondo is a former patient of Paula Young who was last seen 10/29/19 and was scheduled to return 4/28 but her appointment was cancelled due to Dr. Matt Ortega departure. She has been following with PCP, but asked to continue with psychiatry because PCP is not comfortable prescribing Lyrica 100mg. (Dr. Paula Young had placed patient on this medication.)  She has four pills left. She has been scheduled for 7/13/20. Medication has not yet been loaded until further direction from you.

## 2020-09-04 ENCOUNTER — HOSPITAL ENCOUNTER (OUTPATIENT)
Dept: WOMENS IMAGING | Age: 50
Discharge: HOME | End: 2020-09-04
Payer: COMMERCIAL

## 2020-09-04 PROCEDURE — 77063 BREAST TOMOSYNTHESIS BI: CPT

## 2020-09-21 ENCOUNTER — VIRTUAL VISIT (OUTPATIENT)
Dept: PSYCHIATRY | Age: 50
End: 2020-09-21
Payer: COMMERCIAL

## 2020-09-21 PROCEDURE — 90792 PSYCH DIAG EVAL W/MED SRVCS: CPT | Performed by: PSYCHIATRY & NEUROLOGY

## 2020-09-21 RX ORDER — VENLAFAXINE HYDROCHLORIDE 75 MG/1
75 CAPSULE, EXTENDED RELEASE ORAL DAILY
Qty: 30 CAPSULE | Refills: 1 | Status: SHIPPED | OUTPATIENT
Start: 2020-09-21 | End: 2020-11-09 | Stop reason: SDUPTHER

## 2020-09-21 RX ORDER — PANTOPRAZOLE SODIUM 40 MG/1
40 TABLET, DELAYED RELEASE ORAL 2 TIMES DAILY
COMMUNITY
Start: 2020-06-29
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
## 2020-09-29 ENCOUNTER — TELEPHONE (OUTPATIENT)
Dept: CASE MANAGEMENT | Age: 50
End: 2020-09-29

## 2020-09-29 NOTE — TELEPHONE ENCOUNTER
Name: Arabella Villalta  : 1970  MRN: [id number]    Oncology Navigation Follow-Up Note    Contact Type:  Telephone    Notes:  Called patient to explain genetic referral process and location. States \"Wants to hold off especially with COVID, may be interested next year. \" Request received on breast cancer risk assessment completed 2020 at [facility] from mammogram visit. Encouraged her to call if she changes mind before next screening.     Electronically signed by Shelby Meredith RN on 2020 at 12:40 PM

## 2020-11-09 ENCOUNTER — VIRTUAL VISIT (OUTPATIENT)
Dept: PSYCHIATRY | Age: 50
End: 2020-11-09
Payer: COMMERCIAL

## 2020-11-09 PROCEDURE — G8427 DOCREV CUR MEDS BY ELIG CLIN: HCPCS | Performed by: PSYCHIATRY & NEUROLOGY

## 2020-11-09 PROCEDURE — 3017F COLORECTAL CA SCREEN DOC REV: CPT | Performed by: PSYCHIATRY & NEUROLOGY

## 2020-11-09 PROCEDURE — 99214 OFFICE O/P EST MOD 30 MIN: CPT | Performed by: PSYCHIATRY & NEUROLOGY

## 2020-11-09 RX ORDER — TRAZODONE HYDROCHLORIDE 150 MG/1
150 TABLET ORAL NIGHTLY
Qty: 90 TABLET | Refills: 0 | Status: SHIPPED | OUTPATIENT
Start: 2020-11-09 | End: 2021-01-04 | Stop reason: SDUPTHER

## 2020-11-09 RX ORDER — PREGABALIN 100 MG/1
100 CAPSULE ORAL DAILY
Qty: 90 CAPSULE | Refills: 0 | Status: SHIPPED | OUTPATIENT
Start: 2020-11-09 | End: 2021-01-04 | Stop reason: SDUPTHER

## 2020-11-09 RX ORDER — VENLAFAXINE HYDROCHLORIDE 150 MG/1
150 CAPSULE, EXTENDED RELEASE ORAL DAILY
Qty: 90 CAPSULE | Refills: 0 | Status: SHIPPED | OUTPATIENT
Start: 2020-11-09 | End: 2021-01-04 | Stop reason: SDUPTHER

## 2020-11-09 RX ORDER — HYDROXYZINE PAMOATE 25 MG/1
25 CAPSULE ORAL 2 TIMES DAILY PRN
Qty: 180 CAPSULE | Refills: 0 | Status: SHIPPED | OUTPATIENT
Start: 2020-11-09 | End: 2021-01-04 | Stop reason: SDUPTHER

## 2020-11-09 NOTE — PROGRESS NOTES
stop kicking her legs. Has been going on since \"forever, my entire adult life\". When it happens she will go to bed to avoid it. Feels like a compulsion. Going to bed and laying down relieves it. Happens every night. Discussed options today and we agree to further increase Effexor for picking compulsions, anxiety, mood. May consider Lyrica increase going forward for pain interfering with sleep at night. Past Medical, Social, Family Hx: see above    ROS:  Med Trials:trazodone, Prozac, hydroxyzine, Lexapro, Lyrica, NAC, Cymbalta, Topamax, Elavil, Xanax, Effexor (benefit)    OBJECTIVE:  Vitals: There were no vitals taken for this visit. MENTAL STATUS EXAM:    GENERAL  Build: Overweight    Hygiene:  Appropriate    SENSORIUM Orientation: Place, Person, Time, & Situation     Consciousness: Alert    ATTENTION   Focused    RELATEDNESS  Cooperative    EYE CONTACT   Good    PSYCHOMOTOR  Normal    SPEECH Volume: Normal     Rate: Normal rate and tone    Amplitude: Within normal limits   MOOD  Euthymic    AFFECT Range: Full    THOUGHT Process:  Goal-Directed     Content: no evidence of psychosis    COGNITION Insight: Good    Judgement:  Intact    MEMORY  Intact    INTELLIGENCE  Average     Mobility/Gait: Independently     Controlled SubstancesMonitoring: Periodic Controlled Substance Monitoring: No signs of potential drug abuse or diversion identified. , Possible medication side effects, risk of tolerance/dependence & alternative treatments discussed. Tristian Castellon MD)       ASSESSMENT: Will increase Effexor XR for picking compulsions, anxiety, mood. Has shown benefit so far. May consider Lyrica increase for pain interfering with sleep at night. No SI. Diagnosis Orders   1. Skin-picking disorder     2. Anxiety  traZODone (DESYREL) 150 MG tablet   3. Chronic pain syndrome  pregabalin (LYRICA) 100 MG capsule   4.  Depression, unspecified depression type     R/o: OCD, STEVEN, PTSD, panic d/o  H/o alcohol abuse, last dilcia 2 years ago  PMH:     PLAN:     · Medications:   · Increase Effexor XR to 150 mg QAM (from 75 mg) - for anxiety, picking/compulsions, and depression  · Lyrica 100 mg HS - for pain, may have anxiety and sleep benefit  · Trazodone 150 mg HS - for insomnia and depression  · Vistaril 25 mg BID prn anxiety or insomnia - uses at night  · Therapy: none, will offer   · Labs/Tests/Imaging: none   · Records Reviewed: [facility]  · Patient advised to call if patient has any difficulties with treatment  Return in about 6 weeks (around 12/21/2020) for med check, follow up. Electronically signed by Avinash Hess MD on 11/9/2020 at 2:03 PM    Sumaya Solis is a 44 y.o. female being evaluated by a Virtual Visit (video visit) to address concerns as mentioned above. A caregiver was present when appropriate. Due to this being a TeleHealth encounter (GNQQC-56 public health emergency), evaluation of the following organ systems was limited: Vitals/Constitutional/EENT/Resp/CV/GI//MS/Neuro/Skin/Heme-Lymph-Imm. Pursuant to the emergency declaration under the [address] [place] [address] and the [employer] and [employer] Act, this Virtual Visit was conducted with patient's (and/or legal guardian's) consent, to reduce the patient's risk of exposure to COVID-19 and provide necessary medical care. The patient (and/or legal guardian) has also been advised to contact this office for worsening conditions or problems, and seek emergency medical treatment and/or call 911 if deemed necessary. Patient identification was verified at the start of the visit: Yes     Total time spent for this encounter: 26 minutes     Services were provided through a video synchronous discussion virtually to substitute for in-person clinic visit. Patient and provider were located at their individual homes.     Electronically signed by Avinash Hess MD on 11/9/2020 at 2:03 PM

## 2021-01-04 ENCOUNTER — VIRTUAL VISIT (OUTPATIENT)
Dept: PSYCHIATRY | Age: 51
End: 2021-01-04
Payer: COMMERCIAL

## 2021-01-04 DIAGNOSIS — G89.4 CHRONIC PAIN SYNDROME: ICD-10-CM

## 2021-01-04 DIAGNOSIS — F41.9 ANXIETY: ICD-10-CM

## 2021-01-04 DIAGNOSIS — F32.A DEPRESSION, UNSPECIFIED DEPRESSION TYPE: ICD-10-CM

## 2021-01-04 DIAGNOSIS — F42.4 SKIN-PICKING DISORDER: Primary | ICD-10-CM

## 2021-01-04 PROCEDURE — 99214 OFFICE O/P EST MOD 30 MIN: CPT | Performed by: PSYCHIATRY & NEUROLOGY

## 2021-01-04 PROCEDURE — 3017F COLORECTAL CA SCREEN DOC REV: CPT | Performed by: PSYCHIATRY & NEUROLOGY

## 2021-01-04 PROCEDURE — G8427 DOCREV CUR MEDS BY ELIG CLIN: HCPCS | Performed by: PSYCHIATRY & NEUROLOGY

## 2021-01-04 RX ORDER — TRAZODONE HYDROCHLORIDE 150 MG/1
150 TABLET ORAL NIGHTLY
Qty: 90 TABLET | Refills: 0 | Status: SHIPPED | OUTPATIENT
Start: 2021-01-04 | End: 2021-03-04 | Stop reason: SDUPTHER

## 2021-01-04 RX ORDER — DAPAGLIFLOZIN 10 MG/1
10 TABLET, FILM COATED ORAL EVERY MORNING
COMMUNITY
Start: 2020-10-13

## 2021-01-04 RX ORDER — PREGABALIN 200 MG/1
200 CAPSULE ORAL NIGHTLY
Qty: 90 CAPSULE | Refills: 0 | Status: SHIPPED | OUTPATIENT
Start: 2021-01-04 | End: 2021-03-04 | Stop reason: SDUPTHER

## 2021-01-04 RX ORDER — HYDROXYZINE PAMOATE 25 MG/1
25 CAPSULE ORAL 2 TIMES DAILY PRN
Qty: 180 CAPSULE | Refills: 0 | Status: SHIPPED | OUTPATIENT
Start: 2021-01-04 | End: 2021-04-04

## 2021-01-04 RX ORDER — ORAL SEMAGLUTIDE 14 MG/1
14 TABLET ORAL
COMMUNITY
Start: 2020-11-12 | End: 2021-05-05

## 2021-01-04 RX ORDER — VENLAFAXINE HYDROCHLORIDE 150 MG/1
150 CAPSULE, EXTENDED RELEASE ORAL DAILY
Qty: 90 CAPSULE | Refills: 0 | Status: SHIPPED | OUTPATIENT
Start: 2021-01-04 | End: 2021-03-04 | Stop reason: SDUPTHER

## 2021-01-04 NOTE — PROGRESS NOTES
Discussed with her to try using the Vistaril only prn insomnia or itching since she takes 3 sleep meds. I agree to continue Lyrica at 200 mg dose. Past Medical, Social, Family Hx: see above    Med Trials:trazodone, Prozac, hydroxyzine, Lexapro, Lyrica, NAC, Cymbalta, Topamax, Elavil, Xanax, Effexor (benefit)    OBJECTIVE:  Vitals: There were no vitals taken for this visit. MENTAL STATUS EXAM:    GENERAL  Build: Overweight    Hygiene:  Appropriate in casual dress   SENSORIUM Orientation: Place, Person, Time, & Situation     Consciousness: Alert    ATTENTION   Focused    RELATEDNESS  Cooperative    EYE CONTACT   Good    PSYCHOMOTOR  Normal    SPEECH Volume: Normal     Rate: Normal rate and upbeat tone    Amplitude: Within normal limits   MOOD  Euthymic    AFFECT Range: Full, bright   THOUGHT Process:  Goal-Directed     Content: no evidence of psychosis    COGNITION Insight: Good    Judgement:  Intact    MEMORY  Intact    INTELLIGENCE  Average     Mobility/Gait: Independently     Controlled SubstancesMonitoring: Periodic Controlled Substance Monitoring: No signs of potential drug abuse or diversion identified. , Possible medication side effects, risk of tolerance/dependence & alternative treatments discussed. Tee Montalvo MD)       ASSESSMENT: Mood and picking improved with Effexor (along with other medical issues being addressed). More energy and motivation. More interest and engagement. Sleep a little worse with Effexor. Will increase Lyrica which helps her sleep and pain. No SI. Will monitor skin picking which is resolved. Diagnosis Orders   1. Skin-picking disorder     2. Chronic pain syndrome  pregabalin (LYRICA) 200 MG capsule   3. Anxiety  traZODone (DESYREL) 150 MG tablet   4.  Depression, unspecified depression type     R/o: OCD, STEVEN, PTSD, panic d/o  H/o alcohol abuse, last drank 2 years ago  PMH:     PLAN:     · Medications: · Effexor  mg QAM - for anxiety, picking/compulsions, and depression  · Increase Lyrica to 200 mg HS (from 100 mg) - for pain, may have anxiety and sleep benefit  · Trazodone 150 mg HS - for insomnia and depression  · Vistaril 25 mg BID prn anxiety or insomnia - uses at night - advised using only prn   · Therapy: none  · Labs/Tests/Imaging: none   · Records Reviewed: [facility]  · Patient advised to call if patient has any difficulties with treatment  Return in about 2 months (around 3/4/2021) for med check, follow up. Electronically signed by Demarco Keene MD on 1/4/2021 at 10:06 AM    To Nye is a 44 y.o. female being evaluated by a Virtual Visit (video visit) to address concerns as mentioned above. A caregiver was present when appropriate. Due to this being a TeleHealth encounter (KUNQJ-81 [facility] emergency), evaluation of the following organ systems was limited: Vitals/Constitutional/EENT/Resp/CV/GI//MS/Neuro/Skin/Heme-Lymph-Imm. Pursuant to the emergency declaration under the [address] [place] [address] authority and the [employer] and [employer] Act, this Virtual Visit was conducted with patient's (and/or legal guardian's) consent, to reduce the patient's risk of exposure to COVID-19 and provide necessary medical care. The patient (and/or legal guardian) has also been advised to contact this office for worsening conditions or problems, and seek emergency medical treatment and/or call 911 if deemed necessary. Patient identification was verified at the start of the visit: Yes     Total time spent for this encounter: 31 minutes     Services were provided through a video synchronous discussion virtually to substitute for in-person clinic visit. Patient and provider were located at their individual homes.     Electronically signed by Demarco Keene MD on 1/4/2021 at 10:06 AM

## 2021-03-04 ENCOUNTER — VIRTUAL VISIT (OUTPATIENT)
Dept: PSYCHIATRY | Age: 51
End: 2021-03-04
Payer: COMMERCIAL

## 2021-03-04 DIAGNOSIS — F42.4 SKIN-PICKING DISORDER: Primary | ICD-10-CM

## 2021-03-04 DIAGNOSIS — F41.9 ANXIETY: ICD-10-CM

## 2021-03-04 DIAGNOSIS — G89.4 CHRONIC PAIN SYNDROME: ICD-10-CM

## 2021-03-04 DIAGNOSIS — F32.A DEPRESSION, UNSPECIFIED DEPRESSION TYPE: ICD-10-CM

## 2021-03-04 PROCEDURE — 3017F COLORECTAL CA SCREEN DOC REV: CPT | Performed by: PSYCHIATRY & NEUROLOGY

## 2021-03-04 PROCEDURE — G8427 DOCREV CUR MEDS BY ELIG CLIN: HCPCS | Performed by: PSYCHIATRY & NEUROLOGY

## 2021-03-04 PROCEDURE — 90833 PSYTX W PT W E/M 30 MIN: CPT | Performed by: PSYCHIATRY & NEUROLOGY

## 2021-03-04 PROCEDURE — 99213 OFFICE O/P EST LOW 20 MIN: CPT | Performed by: PSYCHIATRY & NEUROLOGY

## 2021-03-04 RX ORDER — PREGABALIN 200 MG/1
200 CAPSULE ORAL NIGHTLY
Qty: 90 CAPSULE | Refills: 0 | Status: SHIPPED | OUTPATIENT
Start: 2021-03-04 | End: 2021-05-05 | Stop reason: SDUPTHER

## 2021-03-04 RX ORDER — TRAZODONE HYDROCHLORIDE 150 MG/1
150 TABLET ORAL NIGHTLY
Qty: 90 TABLET | Refills: 0 | Status: SHIPPED | OUTPATIENT
Start: 2021-03-04 | End: 2021-05-05 | Stop reason: SDUPTHER

## 2021-03-04 RX ORDER — VENLAFAXINE HYDROCHLORIDE 150 MG/1
150 CAPSULE, EXTENDED RELEASE ORAL DAILY
Qty: 90 CAPSULE | Refills: 0 | Status: SHIPPED | OUTPATIENT
Start: 2021-03-04 | End: 2021-05-05 | Stop reason: SDUPTHER

## 2021-03-04 NOTE — PROGRESS NOTES
[address] PSYCHIATRY  [facility] [phone]    Progress Note    Patient:  Sue Hicks  YOB: 1970  PCP:  Dama Rubinstein, MD  Visit Date:  3/4/2021    TELEHEALTH EVALUATION -- Audio/Visual (During BULLI-15 public health emergency)    Patient location: home  Physician location: [place]  This virtual visit was conducted via interactive, real-time video. Chief Complaint   Patient presents with    Follow-up    Medication Check    Anxiety    Depression       SUBJECTIVE:    Time in: 9:35am  Time Out: 10:01am    Sue Hicks, a 44 y. o. female, presents for a follow up visit. Patient reports she is doing well. Patient is compliant with medication regimen. She presents alone. \"Doing good. \"   Mood is good and stable. Anxiety under fair control. Enjoying better weather. Trying to be more active and went for a walk yesterday. Still working on weight loss and doing well with it. Stopped hydroxyzine at night and her restless legs improved \"95%\". Sleep varies. Maybe a little worse off it. Can stay asleep better. They got a new bed, Purple mattress which seems to help her pain but might be too soft noting some back pain. Would like to sleep better. Has never tried melatonin and plans to try it. Feels dehydrated. \"I drink coffee all day. \" Advised she drink 2 cups of water for each cup of coffee. Provided education on diuretic effect of coffee. 3, 20 oz cups of coffee per day. Plans to try using more decaf. Encouraged better hydration. We discussed how caffeine could worsen her pain by dehydrating muscles and causing muscle tension. She's very receptive to this. She gets regular massages. Denies any picking, \"Nothing. \"  Spent much of session in psychotherapy discussing psychoeducation, lifestyle changes, strategies for change.        Med Trials:trazodone, Prozac, hydroxyzine, Lexapro, Lyrica, NAC, Cymbalta, Topamax, Elavil, Xanax, Effexor (benefit)    OBJECTIVE:  Vitals: There were no vitals taken for this visit. MENTAL STATUS EXAM:    GENERAL  Build: Overweight    Hygiene:  Appropriate    SENSORIUM Orientation: Place, Person, Time, & Situation     Consciousness: Alert    ATTENTION   Focused    RELATEDNESS  Cooperative    EYE CONTACT   Good    PSYCHOMOTOR  Normal    SPEECH Volume: Normal     Rate: Normal rate and tone    Amplitude: Within normal limits   MOOD  Euthymic    AFFECT Range: Full, bright   THOUGHT Process:  Goal-Directed     Content: no evidence of psychosis    COGNITION Insight: Good    Judgement:  Intact    MEMORY  Intact    INTELLIGENCE  Average     Mobility/Gait: Independently     Controlled SubstancesMonitoring: Periodic Controlled Substance Monitoring: No signs of potential drug abuse or diversion identified. , Possible medication side effects, risk of tolerance/dependence & alternative treatments discussed. Tri Rivas MD)       ASSESSMENT: Doing well today. Plans to try melatonin for sleep. Now off Vistaril which caused RLS. Plans to reduce caffeine use and increase hydration. Mood is good, anxiety under control. No further changes otherwise. Effexor improved mood and skin-picking. Also improved energy, anhedonia, and motivation. Lyrica improved sleep and pain. Diagnosis Orders   1. Skin-picking disorder     2. Anxiety  traZODone (DESYREL) 150 MG tablet   3. Chronic pain syndrome  pregabalin (LYRICA) 200 MG capsule   4.  Depression, unspecified depression type     R/o: OCD, STEVEN, PTSD, panic d/o  H/o alcohol abuse, last drank 2 years ago  Medical Hx:    PLAN:     · Medications:   · Effexor  mg QAM - for anxiety, picking/compulsions, and depression  · Lyrica 200 mg HS  - for pain, may have anxiety and sleep benefit  · Trazodone 150 mg HS - for insomnia and depression  · Stopped Vistaril 25 mg BID prn anxiety or insomnia  · Plans to start melatonin OTC  · Therapy: none  · Labs/Tests/Imaging: none   · Records Reviewed: [facility]  · Patient advised to call if patient has any difficulties with treatment  Return in about 8 weeks (around 4/29/2021) for med check, follow up. I spent 26 minutes face to face with this patient. I spent 18 minutes or longer in psychotherapy discussing psychoeducation, lifestyle changes, strategies for change. Remainder of time spent on symptom evaluation and medication management. Electronically signed by Gaby Leone MD on 3/4/2021 at 10:02 AM    Sonya Lopez is a 44 y.o. female being evaluated by a Virtual Visit (video visit) to address concerns as mentioned above. A caregiver was present when appropriate. Due to this being a TeleHealth encounter (XNGUD-15 public health emergency), evaluation of the following organ systems was limited: Vitals/Constitutional/EENT/Resp/CV/GI//MS/Neuro/Skin/Heme-Lymph-Imm. Pursuant to the emergency declaration under the [address] [place] [address] authority and the [employer] and [employer] Act, this Virtual Visit was conducted with patient's (and/or legal guardian's) consent, to reduce the patient's risk of exposure to COVID-19 and provide necessary medical care. The patient (and/or legal guardian) has also been advised to contact this office for worsening conditions or problems, and seek emergency medical treatment and/or call 911 if deemed necessary. Patient identification was verified at the start of the visit: Yes     Total time spent for this encounter: 26 minutes      Services were provided through a video synchronous discussion virtually to substitute for in-person clinic visit. Patient and provider were located at their individual homes.     Electronically signed by Gaby Leone MD on 3/4/2021 at 10:02 AM

## 2021-05-05 ENCOUNTER — VIRTUAL VISIT (OUTPATIENT)
Dept: PSYCHIATRY | Age: 51
End: 2021-05-05
Payer: COMMERCIAL

## 2021-05-05 DIAGNOSIS — F41.9 ANXIETY: ICD-10-CM

## 2021-05-05 DIAGNOSIS — G89.4 CHRONIC PAIN SYNDROME: ICD-10-CM

## 2021-05-05 DIAGNOSIS — F42.4 SKIN-PICKING DISORDER: Primary | ICD-10-CM

## 2021-05-05 DIAGNOSIS — F32.A DEPRESSION, UNSPECIFIED DEPRESSION TYPE: ICD-10-CM

## 2021-05-05 PROCEDURE — 3017F COLORECTAL CA SCREEN DOC REV: CPT | Performed by: PSYCHIATRY & NEUROLOGY

## 2021-05-05 PROCEDURE — 99214 OFFICE O/P EST MOD 30 MIN: CPT | Performed by: PSYCHIATRY & NEUROLOGY

## 2021-05-05 PROCEDURE — G8427 DOCREV CUR MEDS BY ELIG CLIN: HCPCS | Performed by: PSYCHIATRY & NEUROLOGY

## 2021-05-05 RX ORDER — PREGABALIN 200 MG/1
200 CAPSULE ORAL NIGHTLY
Qty: 90 CAPSULE | Refills: 0 | Status: SHIPPED | OUTPATIENT
Start: 2021-05-05 | End: 2021-07-01 | Stop reason: SDUPTHER

## 2021-05-05 RX ORDER — SEMAGLUTIDE 1.34 MG/ML
1 INJECTION, SOLUTION SUBCUTANEOUS WEEKLY
COMMUNITY

## 2021-05-05 RX ORDER — VENLAFAXINE HYDROCHLORIDE 150 MG/1
150 CAPSULE, EXTENDED RELEASE ORAL DAILY
Qty: 90 CAPSULE | Refills: 0 | Status: SHIPPED | OUTPATIENT
Start: 2021-05-05 | End: 2021-07-01 | Stop reason: SDUPTHER

## 2021-05-05 RX ORDER — TRAZODONE HYDROCHLORIDE 100 MG/1
200 TABLET ORAL NIGHTLY
Qty: 180 TABLET | Refills: 0 | Status: SHIPPED | OUTPATIENT
Start: 2021-05-05 | End: 2021-07-01 | Stop reason: SDUPTHER

## 2021-05-05 NOTE — PROGRESS NOTES
[address]  [facility] [phone]    Progress Note    Patient:  Juan Carlos Pabon  YOB: 1970  PCP:  Brennen Rodriguez MD  Visit Date:  5/5/2021    TELEHEALTH EVALUATION -- Audio/Visual (During RUYOT-71 public health emergency)    Patient location: home  Physician location: [place], [facility]  This virtual visit was conducted via interactive, real-time video. Chief Complaint   Patient presents with    Follow-up    Medication Check    Anxiety    Depression       SUBJECTIVE:      Juan Carlos Pabon, a 44 y. o. female, presents for a follow up visit. Patient reports she is doing well. Patient is compliant with medication regimen. She presents alone. Doing well overall. Doing massage and chiro at [facility]. Went to [facility] who felt she was having sciatica and achilles tendonitis. Plans to start PT as well. Sciatic pain which radiates down R leg bothers her. More trouble sleeping x couple weeks. Still off Vistaril which caused restless legs which is resolved. Started melatonin and can't stay asleep. Will sleep 5-6 hours then will be up. Using 10 mg. Couldn't fall asleep with 5 mg. Still using trazodone 150 mg which she's been on x several years. Was started on prednisone a week ago. Doesn't think worsening sleep is from that although does hve more energy with it. Will be on it x 2 weeks. Denies any depressed mood. Denies any anxiety. No picking. No SI. Remodeling the house which is torn up. Describes \"OCD issue\". Particular about cleaning and where items are. \"It's hugh bothering me. \" Project going slower than expected. Her  is getting up an hour earlier and might be disrupting her sleep. He gets up around 4:20am.   Down to 2 cups of coffee per day. I advised she cut back even further. Last cup at 11am.   Began having sleep issues when dx with fibromyalgia.  Has been 150 mg QAM - for anxiety, picking/compulsions, and depression  · Lyrica 200 mg HS  - for pain, may have anxiety and sleep benefit  · Increase Trazodone 150 mg HS to 200 mg HS  - for insomnia and depression  · Melatonin OTC  · Therapy: none  · Labs/Tests/Imaging: none   · Records Reviewed: [facility]  · Patient advised to call if patient has any difficulties with treatment  Return in about 4 weeks (around 6/2/2021) for med check, follow up. Electronically signed by Mode Alcocer MD on 5/5/2021 at 9:54 AM    Sejal Fink is a 44 y.o. female being evaluated by a Virtual Visit (video visit) to address concerns as mentioned above. A caregiver was present when appropriate. Due to this being a TeleHealth encounter (TSDXR-52 [facility] emergency), evaluation of the following organ systems was limited: Vitals/Constitutional/EENT/Resp/CV/GI//MS/Neuro/Skin/Heme-Lymph-Imm. Pursuant to the emergency declaration under the [address] [place] [address] authority and the [employer] and [employer] Act, this Virtual Visit was conducted with patient's (and/or legal guardian's) consent, to reduce the patient's risk of exposure to COVID-19 and provide necessary medical care. The patient (and/or legal guardian) has also been advised to contact this office for worsening conditions or problems, and seek emergency medical treatment and/or call 911 if deemed necessary. Patient identification was verified at the start of the visit: Yes     Total time spent for this encounter: not billed by time     Services were provided through a video synchronous discussion virtually to substitute for in-person clinic visit. Patient and provider were located at their individual homes.     Electronically signed by Mode Alcocer MD on 5/5/2021 at 9:54 AM

## 2021-06-30 ENCOUNTER — NURSE ONLY (OUTPATIENT)
Dept: LAB | Age: 51
End: 2021-06-30

## 2021-07-01 ENCOUNTER — VIRTUAL VISIT (OUTPATIENT)
Dept: PSYCHIATRY | Age: 51
End: 2021-07-01
Payer: COMMERCIAL

## 2021-07-01 DIAGNOSIS — F41.9 ANXIETY: ICD-10-CM

## 2021-07-01 DIAGNOSIS — G89.4 CHRONIC PAIN SYNDROME: ICD-10-CM

## 2021-07-01 DIAGNOSIS — F32.A DEPRESSION, UNSPECIFIED DEPRESSION TYPE: ICD-10-CM

## 2021-07-01 DIAGNOSIS — F42.4 SKIN-PICKING DISORDER: Primary | ICD-10-CM

## 2021-07-01 PROCEDURE — 3017F COLORECTAL CA SCREEN DOC REV: CPT | Performed by: PSYCHIATRY & NEUROLOGY

## 2021-07-01 PROCEDURE — G8427 DOCREV CUR MEDS BY ELIG CLIN: HCPCS | Performed by: PSYCHIATRY & NEUROLOGY

## 2021-07-01 PROCEDURE — 99213 OFFICE O/P EST LOW 20 MIN: CPT | Performed by: PSYCHIATRY & NEUROLOGY

## 2021-07-01 RX ORDER — TRAZODONE HYDROCHLORIDE 100 MG/1
200 TABLET ORAL NIGHTLY
Qty: 180 TABLET | Refills: 0 | Status: SHIPPED | OUTPATIENT
Start: 2021-07-01 | End: 2021-09-09 | Stop reason: SDUPTHER

## 2021-07-01 RX ORDER — VENLAFAXINE HYDROCHLORIDE 150 MG/1
150 CAPSULE, EXTENDED RELEASE ORAL DAILY
Qty: 90 CAPSULE | Refills: 0 | Status: SHIPPED | OUTPATIENT
Start: 2021-07-01 | End: 2021-09-09 | Stop reason: SDUPTHER

## 2021-07-01 RX ORDER — PREGABALIN 200 MG/1
200 CAPSULE ORAL NIGHTLY
Qty: 90 CAPSULE | Refills: 0 | Status: SHIPPED | OUTPATIENT
Start: 2021-07-01 | End: 2021-09-09 | Stop reason: SDUPTHER

## 2021-07-01 NOTE — PROGRESS NOTES
[address]  [facility] [phone]    Progress Note    Patient:  Waleska Hernandez  YOB: 1970  PCP:  Byron Ramey MD  Visit Date:  7/1/2021    TELEHEALTH EVALUATION -- Audio/Visual (During GKIRB-76 public health emergency)    Patient location: home  Physician location: home, [address] [place] [address]  This virtual visit was conducted via interactive, real-time video. Chief Complaint   Patient presents with    Follow-up    Medication Check    Anxiety    Depression    Insomnia       SUBJECTIVE:      Waleska Hernandez, a 44 y. o. female, presents for a follow up visit. Patient reports she is doing well. Patient is compliant with medication regimen. She presents alone. Doing well \"really good\". Enjoying her summer. In PT for leg and back pain and finished yesterday. Has EMG coming up (7/20). Thinks she has tarsal tunnel. Seeing [facility]/Dr. Sandoval Ernst (foot and ankle) and Dr. Sanya Fontenot for her spinal issues and getting imaging. Did 6 weeks of PT. Notes home exercises make her feel better but pain never goes away. Does the exercises \"religiously\". Taking Meloxicam, Flexeril, and Tramadol for pain, muscle spasms. Doesn't take Tramadol at night d/t other meds. There are some interactions which we discussed. Risk of serotonin syndrome and signs to watch for such as tremor or diarrhea. Requires mild sedation for MRI d/t claustrophobia. Uses melatonin every night. Trazodone increase has helped her sleep through the night. No longer taking steroids and I advised that could have been worsening her sleep. Doesn't want to make any further med changes today. She wants to take Effexor at night. Has trouble with it in AM because has to space out thyroid meds.  Advised she can try taking it at night but if sleep worsens she should move it back to AM. She had missed Effexor a couple times but didn't c/o withdrawal issues. Mood is good and stable. Anxiety under control. No picking. No SI.  has noticed she's doing well. Had a couple bug bites and hasn't picked at them. Her son is a  \"incessantly\". Med Trials:trazodone, Prozac, hydroxyzine, Lexapro, Lyrica, NAC, Cymbalta, Topamax, Elavil, Xanax, Effexor (benefit), Vistaril (RLS)    OBJECTIVE:  Vitals: There were no vitals taken for this visit. MENTAL STATUS EXAM:    GENERAL  Build: Overweight    Hygiene:  Appropriate , well groomed and dressed   SENSORIUM Orientation: Place, Person, Time, & Situation     Consciousness: Alert    ATTENTION   Focused    RELATEDNESS  Cooperative    EYE CONTACT   Good    PSYCHOMOTOR  Normal    SPEECH Volume: Normal     Rate: Normal rate and tone    Amplitude: Within normal limits   MOOD  Euthymic    AFFECT Range: Full, bright   THOUGHT Process:  Goal-Directed     Content: no evidence of psychosis    COGNITION Insight: Good    Judgement:  Intact    MEMORY  Intact    INTELLIGENCE  Average     Mobility/Gait: Independently     Controlled SubstancesMonitoring: Periodic Controlled Substance Monitoring: No signs of potential drug abuse or diversion identified. , Possible medication side effects, risk of tolerance/dependence & alternative treatments discussed. Avinash Hess MD)       ASSESSMENT: Will change Effexor to night dosing and observe for any worsening of sleep. Sleep improved with last trazodone increase. Mood stable. No picking recently. Will monitor caffeine use. Ongoing pain issues. Effexor improved mood, skin-picking, energy, anhedonia, and motivation. Lyrica improved sleep and pain. Diagnosis Orders   1. Skin-picking disorder     2. Anxiety  traZODone (DESYREL) 100 MG tablet   3. Chronic pain syndrome  pregabalin (LYRICA) 200 MG capsule   4.  Depression, unspecified depression type     R/o: OCD, STEVEN, PTSD, panic d/o  H/o alcohol abuse, last drank 2 years ago  Medical Hx:    PLAN: · Medications:   · Change Effexor  mg QAM to QHS - for anxiety, picking/compulsions, and depression  · Lyrica 200 mg HS  - for pain, may have anxiety and sleep benefit  · Trazodone 200 mg HS  - for insomnia and depression  · Melatonin OTC  · Therapy: none  · Labs/Tests/Imaging: none   · Records Reviewed: [facility]  · Patient advised to call if patient has any difficulties with treatment  Return in about 8 weeks (around 8/26/2021) for med check, follow up. Electronically signed by Griselda Severino MD on 7/1/2021 at 9:56 AM    Rea Clark is a 44 y.o. female being evaluated by a Virtual Visit (video visit) to address concerns as mentioned above. A caregiver was present when appropriate. Due to this being a TeleHealth encounter (CRFGY-06 public health emergency), evaluation of the following organ systems was limited: Vitals/Constitutional/EENT/Resp/CV/GI//MS/Neuro/Skin/Heme-Lymph-Imm. Pursuant to the emergency declaration under the [address] [place] [address], [address] [place] [address] authority and the [email or web address] and [employer] Act, this Virtual Visit was conducted with patient's (and/or legal guardian's) consent, to reduce the patient's risk of exposure to COVID-19 and provide necessary medical care. The patient (and/or legal guardian) has also been advised to contact this office for worsening conditions or problems, and seek emergency medical treatment and/or call 911 if deemed necessary. Patient identification was verified at the start of the visit: Yes     Total time spent for this encounter: not billed by time     Services were provided through a video synchronous discussion virtually to substitute for in-person clinic visit. Patient and provider were located at their individual homes.     Electronically signed by Griselda Severino MD on 7/1/2021 at 9:56 AM

## 2021-07-07 ENCOUNTER — NURSE ONLY (OUTPATIENT)
Dept: LAB | Age: 51
End: 2021-07-07

## 2021-09-09 ENCOUNTER — VIRTUAL VISIT (OUTPATIENT)
Dept: PSYCHIATRY | Age: 51
End: 2021-09-09
Payer: COMMERCIAL

## 2021-09-09 DIAGNOSIS — F42.4 SKIN-PICKING DISORDER: Primary | ICD-10-CM

## 2021-09-09 DIAGNOSIS — F32.A DEPRESSION, UNSPECIFIED DEPRESSION TYPE: ICD-10-CM

## 2021-09-09 DIAGNOSIS — G89.4 CHRONIC PAIN SYNDROME: ICD-10-CM

## 2021-09-09 DIAGNOSIS — F41.9 ANXIETY: ICD-10-CM

## 2021-09-09 PROCEDURE — 3017F COLORECTAL CA SCREEN DOC REV: CPT | Performed by: PSYCHIATRY & NEUROLOGY

## 2021-09-09 PROCEDURE — 99213 OFFICE O/P EST LOW 20 MIN: CPT | Performed by: PSYCHIATRY & NEUROLOGY

## 2021-09-09 PROCEDURE — G8427 DOCREV CUR MEDS BY ELIG CLIN: HCPCS | Performed by: PSYCHIATRY & NEUROLOGY

## 2021-09-09 RX ORDER — TRAZODONE HYDROCHLORIDE 100 MG/1
200 TABLET ORAL NIGHTLY
Qty: 180 TABLET | Refills: 0 | Status: SHIPPED | OUTPATIENT
Start: 2021-09-09 | End: 2021-12-09 | Stop reason: SDUPTHER

## 2021-09-09 RX ORDER — VENLAFAXINE HYDROCHLORIDE 150 MG/1
150 CAPSULE, EXTENDED RELEASE ORAL DAILY
Qty: 90 CAPSULE | Refills: 0 | Status: SHIPPED | OUTPATIENT
Start: 2021-09-09 | End: 2021-12-09 | Stop reason: SDUPTHER

## 2021-09-09 RX ORDER — PREGABALIN 200 MG/1
200 CAPSULE ORAL NIGHTLY
Qty: 90 CAPSULE | Refills: 0 | Status: SHIPPED | OUTPATIENT
Start: 2021-09-09 | End: 2021-12-09 | Stop reason: SDUPTHER

## 2021-09-09 NOTE — PROGRESS NOTES
[address]  [facility] [phone]    Progress Note    Patient:  Tunde Neville  YOB: 1970  PCP:  Jose Alberto Gilmore MD  Visit Date:  9/9/2021    TELEHEALTH EVALUATION -- Audio/Visual (During CJLFI-94 public health emergency)    Patient location: home  Physician location: [place], [facility]  This virtual visit was conducted via interactive, real-time video. Chief Complaint   Patient presents with    Follow-up    Medication Check    Depression    Anxiety       SUBJECTIVE:      Tunde Neville, a 44 y. o. female, presents for a follow up visit. Patient reports she is doing well. Patient is compliant with medication regimen. She presents alone. Doing well. Keeping busy which she likes. Mood remains stable. Discuses her health stressors. Did a tarsal tunnel release 8/7 with plantar fasciotomy along with compartment syndrome fasciotomy, all on RLE. Now wearing a boot. Not much relief yet. Dealing with pain that radiates up to her calf. Had rupture of L2-3 disc and planning for fusion in January. Thinks that may be cause of RLE pain. Mood is \"good\", not depressed. No SI. Has been doing more skin picking after foot surgery during 3 week period of being immobile/more idle. Was picking a scab on her R thigh near her knee. Also an area on her thumb. Had a trauma to her thumb nail and began after that. We discussed ways to modify and reduce picking such as wearing bandage or glove. Sleep is \"great\". Was disrupted around her surgery dealing with more pain. Liked changing Effexor to HS dosing. Declines to make any changes today. Med Trials:trazodone, Prozac, hydroxyzine, Lexapro, Lyrica, NAC, Cymbalta, Topamax, Elavil, Xanax, Effexor (benefit), Vistaril (RLS)    OBJECTIVE:  Vitals: There were no vitals taken for this visit.     MENTAL STATUS EXAM:    GENERAL  Build: Overweight    Hygiene:  Appropriate   SENSORIUM Orientation: Place, Person, Time, & Situation     Consciousness: Alert    ATTENTION   Focused    RELATEDNESS  Cooperative    EYE CONTACT   Good    PSYCHOMOTOR  Normal    SPEECH Volume: Normal     Rate: Normal rate and tone    Amplitude: Within normal limits   MOOD  \"good\"    AFFECT Range: Full   THOUGHT Process:  Goal-Directed     Content: no evidence of psychosis    COGNITION Insight: Good    Judgement:  Intact    MEMORY  Intact    INTELLIGENCE  Average     Mobility/Gait: Independently     Controlled SubstancesMonitoring: Periodic Controlled Substance Monitoring: Possible medication side effects, risk of tolerance/dependence & alternative treatments discussed., No signs of potential drug abuse or diversion identified. Keyona Montoya MD)       ASSESSMENT: Mood is stable. Some picking has started back up and plans to use bandages/glove to help resist. No med change today per her request. Will monitor caffeine use. Effexor improved mood, skin-picking, energy, anhedonia, and motivation. Lyrica improved sleep and pain. Diagnosis Orders   1. Skin-picking disorder     2. Anxiety  traZODone (DESYREL) 100 MG tablet   3. Chronic pain syndrome  pregabalin (LYRICA) 200 MG capsule   4. Depression, unspecified depression type     R/o: OCD, STEVEN, PTSD, panic d/o  H/o alcohol abuse, last drank 2 years ago  Medical Hx:    PLAN:     · Medications:   · Effexor  mg QHS - for anxiety, picking/compulsions, and depression  · Lyrica 200 mg HS  - for pain, may have anxiety and sleep benefit  · Trazodone 200 mg HS  - for insomnia and depression  · Melatonin OTC  · Therapy: none  · Labs/Tests/Imaging: none   · Records Reviewed: [facility]  · Patient advised to call if patient has any difficulties with treatment  Return in about 3 months (around 12/9/2021) for med check, follow up.        Electronically signed by Keyona Montoya MD on 9/9/2021 at 11:56 AM    Silva Michelle is a 44

## 2021-10-01 ENCOUNTER — HOSPITAL ENCOUNTER (EMERGENCY)
Age: 51
Discharge: HOME | End: 2021-10-01
Payer: COMMERCIAL

## 2021-10-01 ENCOUNTER — APPOINTMENT (OUTPATIENT)
Dept: GENERAL RADIOLOGY | Age: 51
End: 2021-10-01
Payer: COMMERCIAL

## 2021-10-01 VITALS
HEART RATE: 87 BPM | DIASTOLIC BLOOD PRESSURE: 70 MMHG | TEMPERATURE: 98 F | RESPIRATION RATE: 16 BRPM | OXYGEN SATURATION: 97 % | SYSTOLIC BLOOD PRESSURE: 132 MMHG

## 2021-10-01 DIAGNOSIS — J01.40 ACUTE NON-RECURRENT PANSINUSITIS: ICD-10-CM

## 2021-10-01 DIAGNOSIS — J04.0 LARYNGITIS, ACUTE: Primary | ICD-10-CM

## 2021-10-01 PROCEDURE — 99213 OFFICE O/P EST LOW 20 MIN: CPT | Performed by: NURSE PRACTITIONER

## 2021-10-01 PROCEDURE — 99213 OFFICE O/P EST LOW 20 MIN: CPT

## 2021-10-01 PROCEDURE — 71046 X-RAY EXAM CHEST 2 VIEWS: CPT

## 2021-10-01 RX ORDER — AMOXICILLIN AND CLAVULANATE POTASSIUM 875; 125 MG/1; MG/1
1 TABLET, FILM COATED ORAL 2 TIMES DAILY
Qty: 20 TABLET | Refills: 0 | Status: SHIPPED | OUTPATIENT
Start: 2021-10-01 | End: 2021-10-11

## 2021-10-01 ASSESSMENT — PAIN DESCRIPTION - ORIENTATION: ORIENTATION: UPPER

## 2021-10-01 ASSESSMENT — ENCOUNTER SYMPTOMS
VOICE CHANGE: 1
VOMITING: 0
SHORTNESS OF BREATH: 0
SORE THROAT: 0
SINUS PRESSURE: 1
NAUSEA: 0
COUGH: 1
TROUBLE SWALLOWING: 0
DIARRHEA: 0
BACK PAIN: 0
RHINORRHEA: 1

## 2021-10-01 ASSESSMENT — PAIN DESCRIPTION - LOCATION: LOCATION: FACE

## 2021-10-01 ASSESSMENT — PAIN DESCRIPTION - FREQUENCY: FREQUENCY: CONTINUOUS

## 2021-10-01 ASSESSMENT — PAIN DESCRIPTION - PAIN TYPE: TYPE: ACUTE PAIN

## 2021-10-01 ASSESSMENT — PAIN SCALES - GENERAL: PAINLEVEL_OUTOF10: 5

## 2021-10-01 ASSESSMENT — PAIN - FUNCTIONAL ASSESSMENT: PAIN_FUNCTIONAL_ASSESSMENT: PREVENTS OR INTERFERES SOME ACTIVE ACTIVITIES AND ADLS

## 2021-10-01 ASSESSMENT — PAIN DESCRIPTION - DESCRIPTORS: DESCRIPTORS: PRESSURE

## 2021-10-01 NOTE — ED TRIAGE NOTES
Patient c/o sore throat ,sinus congestion, dry cough, voice hoarse. Headache, sinus pressure, x one wk. Dayquil, nightquil, PCP Augmentin taken for 2 day.

## 2021-10-01 NOTE — ED PROVIDER NOTES
[facility]  Urgent Care Encounter       CHIEF COMPLAINT       Chief Complaint   Patient presents with    Cough    Pharyngitis    Sinus Problem     congestion       Nurses Notes reviewed and I agree except as noted in the HPI. HISTORY OF PRESENT ILLNESS   Yefri Morales is a 46 y.o. female who presents     The history is provided by the patient. No  was used. Cough  Cough characteristics:  Non-productive  Severity:  Mild  Onset quality:  Sudden  Duration:  1 week  Timing:  Constant  Progression:  Waxing and waning  Chronicity:  New  Smoker: no    Context: sick contacts    Context comment:  Children with covid  Relieved by:  None tried  Worsened by:  Nothing  Ineffective treatments:  None tried  Associated symptoms: rhinorrhea    Associated symptoms: no chest pain, no chills, no ear pain, no fever, no headaches, no rash, no shortness of breath and no sore throat        REVIEW OF SYSTEMS     Review of Systems   Constitutional: Positive for appetite change. Negative for activity change, chills, fatigue and fever. HENT: Positive for congestion, rhinorrhea, sinus pressure and voice change. Negative for ear pain, sore throat and trouble swallowing. Respiratory: Positive for cough. Negative for shortness of breath. Cardiovascular: Negative for chest pain. Gastrointestinal: Negative for diarrhea, nausea and vomiting. Musculoskeletal: Negative for back pain and neck stiffness. Skin: Negative for rash. Allergic/Immunologic: Negative for environmental allergies. Neurological: Negative for dizziness, light-headedness and headaches. Hematological: Negative for adenopathy.        PAST MEDICAL HISTORY         Diagnosis Date    Anxiety     Back problem     Chronic back pain     Chronic sinus infection     Constipation     Fibromyalgia     GERD (gastroesophageal reflux disease)     H/O cold sores     acyclovir prn    Hyperlipidemia     Hypothyroid thyroiditis    Insomnia     Kidney stones     Malabsorption     really just gastric sleeve    Obesity     Obsessive compulsive disorder     Prolonged emergence from general anesthesia     Restless leg syndrome     Sleep apnea     wears CPAP every night - Dr. Shaquille Torre incontinence     Type II or unspecified type diabetes mellitus without mention of complication, not stated as uncontrolled     Vitamin D deficiency        SURGICALHISTORY     Patient  has a past surgical history that includes Tonsillectomy (); Kidney stone surgery (); Carpal tunnel release (Bilateral, );  section (); shoulder surgery (Right, ); Spinal fusion (); Vaginal prolapse repair (); Gastric restriction surgery (2016); LEEP (); Knee arthroscopy (Left, 2018); Nasal septum surgery (); Upper gastrointestinal endoscopy (2016); and Foot surgery. CURRENT MEDICATIONS       Discharge Medication List as of 10/1/2021  2:46 PM      CONTINUE these medications which have NOT CHANGED    Details   venlafaxine (EFFEXOR XR) 150 MG extended release capsule Take 1 capsule by mouth daily, Disp-90 capsule, R-0Normal      traZODone (DESYREL) 100 MG tablet Take 2 tablets by mouth nightly, Disp-180 tablet, R-0Normal      pregabalin (LYRICA) 200 MG capsule Take 1 capsule by mouth nightly for 90 days. , Disp-90 capsule, R-0Normal      Semaglutide,0.25 or 0.5MG/DOS, (OZEMPIC, 0.25 OR 0.5 MG/DOSE,) 2 MG/1.5ML SOPN Inject 0.5 mg into the skin once a weekHistorical Med      FARXIGA 10 MG tablet Take 10 mg by mouth every morning, DAWHistorical Med      pantoprazole (PROTONIX) 40 MG tablet Take 40 mg by mouth dailyHistorical Med      metFORMIN (GLUCOPHAGE) 500 MG tablet Take 500 mg by mouth dailyHistorical Med      fluticasone (FLONASE) 50 MCG/ACT nasal spray 2 sprays by Nasal route daily Apply daily to each nare, Disp-1 Bottle, R-3Normal      levothyroxine (SYNTHROID) 150 MCG tablet Take 1 tablet by mouth Daily, Disp-90 tablet, R-1Normal      atorvastatin (LIPITOR) 20 MG tablet Take 1 tablet by mouth daily, Disp-90 tablet, R-1Normal      blood glucose monitor strips One Touch Ultra 2  Glucose test strips. Check sugars one time a day and as need for irregular blood glucose Dx: E11.9, Disp-100 strip, R-1, Normal      Insulin Pen Needle 31G X 5 MM MISC Disp-100 each, R-3, NormalUse 2 daily with Byetta Pen. DX:E11.9      blood glucose monitor kit and supplies Test one  times a day & as needed for symptoms of irregular blood glucose. Dx: E11.9 One Touch Ultra 2 glucose meter, Disp-1 kit, R-0, Normal             ALLERGIES     Patient is is allergic to actos [pioglitazone], dilaudid [hydromorphone hcl], morphine, other, and percocet [oxycodone-acetaminophen]. Patients   Immunization History   Administered Date(s) Administered    Influenza Virus Vaccine 12/16/2016    Influenza, Quadv, IM, PF (6 mo and older Fluzone, Flulaval, Fluarix, and 3 yrs and older Afluria) 10/04/2018    Pneumococcal Conjugate 13-valent (Earney Jad) 10/04/2018    Tdap (Boostrix, Adacel) 12/16/2016       FAMILY HISTORY     Patient's family history includes Breast Cancer in her paternal grandmother; COPD in her mother; Diabetes in her brother, father, mother, and sister; Heart Attack in her brother and father; Heart Disease in her mother; High Blood Pressure in her brother, mother, and sister; Obesity in her brother, mother, and sister; Other in her sister. SOCIAL HISTORY     Patient  reports that she has been smoking cigarettes. She started smoking about 34 years ago. She has a 4.75 pack-year smoking history. She has quit using smokeless tobacco. She reports previous alcohol use. She reports that she does not use drugs.     PHYSICAL EXAM     ED TRIAGE VITALS  BP: 132/70, Temp: 98 °F (36.7 °C), Pulse: 87, Resp: 16, SpO2: 97 %,Estimated body mass index is 45.47 kg/m² as calculated from the following:    Height as of 4/4/19: 5' 7.25\" (1.708 m). Weight as of 4/4/19: 292 lb 8 oz (132.7 kg). ,No LMP recorded. Patient has had an ablation. Physical Exam  Vitals and nursing note reviewed. Constitutional:       General: She is not in acute distress. Appearance: Normal appearance. She is well-developed and well-groomed. She is not ill-appearing, toxic-appearing or diaphoretic. HENT:      Head: Normocephalic. Right Ear: Hearing, tympanic membrane, ear canal and external ear normal. No drainage, swelling or tenderness. No mastoid tenderness. Tympanic membrane is not erythematous. Left Ear: Hearing, tympanic membrane, ear canal and external ear normal. No drainage, swelling or tenderness. No mastoid tenderness. Tympanic membrane is not erythematous. Nose: Congestion present. Right Sinus: Maxillary sinus tenderness present. No frontal sinus tenderness. Left Sinus: Maxillary sinus tenderness present. No frontal sinus tenderness. Mouth/Throat:      Lips: Pink. Mouth: Mucous membranes are moist.      Pharynx: Uvula midline. No posterior oropharyngeal erythema or uvula swelling. Tonsils: No tonsillar exudate or tonsillar abscesses. Eyes:      Conjunctiva/sclera: Conjunctivae normal.      Pupils: Pupils are equal, round, and reactive to light. Cardiovascular:      Rate and Rhythm: Normal rate and regular rhythm. Heart sounds: Normal heart sounds. Pulmonary:      Effort: Pulmonary effort is normal. No accessory muscle usage. Breath sounds: Normal breath sounds. No decreased breath sounds, wheezing, rhonchi or rales. Abdominal:      General: Bowel sounds are normal.      Palpations: Abdomen is soft. Tenderness: There is no abdominal tenderness. There is no right CVA tenderness, left CVA tenderness or guarding. Negative signs include Durham's sign. Musculoskeletal:      Cervical back: Full passive range of motion without pain and normal range of motion. No rigidity. Lymphadenopathy:      Head:      Right side of head: No submental, submandibular, tonsillar, preauricular, posterior auricular or occipital adenopathy. Left side of head: No submental, submandibular, tonsillar, preauricular, posterior auricular or occipital adenopathy. Cervical: No cervical adenopathy. Right cervical: No superficial, deep or posterior cervical adenopathy. Left cervical: No superficial, deep or posterior cervical adenopathy. Upper Body:      Right upper body: No supraclavicular adenopathy. Left upper body: No supraclavicular adenopathy. Skin:     General: Skin is warm and dry. Capillary Refill: Capillary refill takes less than 2 seconds. Findings: No rash. Neurological:      Mental Status: She is alert and oriented to person, place, and time. Psychiatric:         Mood and Affect: Mood normal.         Behavior: Behavior normal. Behavior is cooperative. DIAGNOSTIC RESULTS     Labs:No results found for this visit on 10/01/21. IMAGING:    XR CHEST (2 VW)   Final Result   There is no acute intrathoracic process. **This report has been created using voice recognition software. It may contain minor errors which are inherent in voice recognition technology. **      Final report electronically signed by Dr Tanesha Crouch on 10/1/2021 2:27 PM            EKG:      URGENT CARE COURSE:     Vitals:    10/01/21 1344   BP: 132/70   Pulse: 87   Resp: 16   Temp: 98 °F (36.7 °C)   TempSrc: Temporal   SpO2: 97%       Medications - No data to display         PROCEDURES:  None    FINAL IMPRESSION      1. Laryngitis, acute    2. Acute non-recurrent pansinusitis          DISPOSITION/ PLAN      The patient/Patient representative was advised to rest, drink lots of fluids, take Motrin and Tylenol for any fever, chills generalized body aches. The patient was also advised to monitor urine output for signs of dehydration.   If the patient develops any chest pain, shortness of breath, neck pain or stiffness or abdominal pain or any other concerns, the patient is to call 911 or go to the emergency department for further evaluation. If the patient does not experience any of the above symptoms he is to follow-up with his primary care provider in 2-3 days for reevaluation. The patient/Patient representative are agreeable to the treatment plan at this time. The patient left the urgent care center in stable condition.           PATIENT REFERRED TO:  Jaida Mckenzie MD  [facility] / [address] [place] [address] [address] [address]      DISCHARGE MEDICATIONS:  Discharge Medication List as of 10/1/2021  2:46 PM      START taking these medications    Details   amoxicillin-clavulanate (AUGMENTIN) 875-125 MG per tablet Take 1 tablet by mouth 2 times daily for 10 days, Disp-20 tablet, R-0Normal             Discharge Medication List as of 10/1/2021  2:46 PM      STOP taking these medications       loratadine (CLARITIN) 10 MG tablet Comments:   Reason for Stopping:               Discharge Medication List as of 10/1/2021  2:46 PM          CLEMENTINA Pritchard CNP    (Please note that portions of this note were completed with a voice recognition program. Efforts were made to edit the dictations but occasionally words are mis-transcribed.)           CLEMENTINA Pritchard CNP  10/01/21 7196

## 2021-12-09 ENCOUNTER — VIRTUAL VISIT (OUTPATIENT)
Dept: PSYCHIATRY | Age: 51
End: 2021-12-09
Payer: COMMERCIAL

## 2021-12-09 DIAGNOSIS — F32.A DEPRESSION, UNSPECIFIED DEPRESSION TYPE: ICD-10-CM

## 2021-12-09 DIAGNOSIS — G89.4 CHRONIC PAIN SYNDROME: ICD-10-CM

## 2021-12-09 DIAGNOSIS — F42.4 SKIN-PICKING DISORDER: Primary | ICD-10-CM

## 2021-12-09 DIAGNOSIS — F41.9 ANXIETY: ICD-10-CM

## 2021-12-09 PROCEDURE — 99213 OFFICE O/P EST LOW 20 MIN: CPT | Performed by: PSYCHIATRY & NEUROLOGY

## 2021-12-09 RX ORDER — VENLAFAXINE HYDROCHLORIDE 150 MG/1
150 CAPSULE, EXTENDED RELEASE ORAL DAILY
Qty: 90 CAPSULE | Refills: 0 | Status: SHIPPED | OUTPATIENT
Start: 2021-12-09 | End: 2022-03-30 | Stop reason: SDUPTHER

## 2021-12-09 RX ORDER — PREGABALIN 200 MG/1
200 CAPSULE ORAL NIGHTLY
Qty: 90 CAPSULE | Refills: 0 | Status: SHIPPED | OUTPATIENT
Start: 2021-12-09 | End: 2022-03-30 | Stop reason: SDUPTHER

## 2021-12-09 RX ORDER — TRAZODONE HYDROCHLORIDE 100 MG/1
200 TABLET ORAL NIGHTLY
Qty: 180 TABLET | Refills: 0 | Status: SHIPPED | OUTPATIENT
Start: 2021-12-09 | End: 2022-03-30 | Stop reason: SDUPTHER

## 2021-12-09 NOTE — PROGRESS NOTES
[facility] [phone]    Progress Note    Patient:  Jose Elias Darnell  YOB: 1970  PCP:  Samuel Mason MD  Visit Date:  12/9/2021    TELEHEALTH EVALUATION -- Audio/Visual (During ATHCV-04 public health emergency)    Patient location: home  Physician location: home, [facility]  This virtual visit was conducted via interactive, real-time video. Chief Complaint   Patient presents with    Follow-up    Medication Check    Anxiety    Depression    Pain       SUBJECTIVE:      Jose Elias Darnell, a 46 y. o. female, presents for a follow up visit. Patient reports she is doing well. Patient is compliant with medication regimen. She presents alone. Doing well. Mood is \"okay\". Stable. Anxiety is under control. Still losing weight. Making progress with her diabetes. Enjoys Xmas and looking forward to time spent with family. Notes sleep could be better. Blames being busier. Discussed whether trazodone is feeling less effective. Has old Atarax Rx she was using for itching, had used 50 mg at night but stopped because was on several other sleep meds. Uses melatonin 10 mg which helps her fall asleep. Wakes up over night once for bathroom   Can get back to sleep. Doesn't feel rested in the morning. Ultimately we agree to not make any changes. Cut back on caffeine, not drinking any after 11am. Just 2 cups of coffee. PCP has ordered \"muscle test\" for fibromyalgia. Med Trials:trazodone, Prozac, hydroxyzine, Lexapro, Lyrica, NAC, Cymbalta, Topamax, Elavil, Xanax, Effexor (benefit), Vistaril (RLS)    OBJECTIVE:  Vitals: There were no vitals taken for this visit.     MENTAL STATUS EXAM:    GENERAL  Build: Overweight    Hygiene:  Appropriate, well dressed and groomed   SENSORIUM Orientation: Place, Person, Time, & Situation     Consciousness: Alert    ATTENTION Focused    RELATEDNESS  Cooperative    EYE CONTACT   Good    PSYCHOMOTOR  Normal    SPEECH Volume: Normal     Rate: Normal rate and tone    Amplitude: Within normal limits   MOOD  \"okay\"    AFFECT Range: Full, mood congruent   THOUGHT Process:  Goal-Directed     Content: no evidence of psychosis    COGNITION Insight: Good    Judgement:  Intact    MEMORY  Intact    INTELLIGENCE  Average     Mobility/Gait: Independently     Controlled SubstancesMonitoring: Periodic Controlled Substance Monitoring: No signs of potential drug abuse or diversion identified. , Possible medication side effects, risk of tolerance/dependence & alternative treatments discussed. Tima Copeland MD)       ASSESSMENT: Doing well. Mood is stable. No changes today. Effexor improved mood, skin-picking, energy, anhedonia, and motivation. Lyrica improved sleep and pain. Diagnosis Orders   1. Skin-picking disorder     2. Anxiety  traZODone (DESYREL) 100 MG tablet   3. Depression, unspecified depression type     4. Chronic pain syndrome  pregabalin (LYRICA) 200 MG capsule   R/o: OCD, STEVEN, PTSD, panic d/o  H/o alcohol abuse, last drank 2 years ago  Medical Hx:    PLAN:     · Medications:   · Effexor  mg QHS - for anxiety, picking/compulsions, and depression  · Lyrica 200 mg HS  - for pain, may have anxiety and sleep benefit  · Trazodone 200 mg HS  - for insomnia and depression  · Melatonin 10 mg HS  · Therapy: none  · Labs/Tests/Imaging: none   · Records Reviewed: [facility]  · Patient advised to call if patient has any difficulties with treatment  Return in about 3 months (around 3/9/2022) for med check, follow up. Electronically signed by Tima Copeland MD on 12/9/2021 at 11:49 AM    Marco Silva is a 46 y.o. female being evaluated by a Virtual Visit (video visit) to address concerns as mentioned above. A caregiver was present when appropriate.  Due to this being a TeleHealth encounter [phone] public health emergency), evaluation of the following organ systems was limited: Vitals/Constitutional/EENT/Resp/CV/GI//MS/Neuro/Skin/Heme-Lymph-Imm. Pursuant to the emergency declaration under the [address] [place] [address], [address] [place] [address] and the [employer] Act, this Virtual Visit was conducted with patient's (and/or legal guardian's) consent, to reduce the patient's risk of exposure to COVID-19 and provide necessary medical care. The patient (and/or legal guardian) has also been advised to contact this office for worsening conditions or problems, and seek emergency medical treatment and/or call 911 if deemed necessary. Patient identification was verified at the start of the visit: Yes     Total time spent for this encounter: not billed by time     Services were provided through a video synchronous discussion virtually to substitute for in-person clinic visit. Patient and provider were located at their individual homes.     Electronically signed by Chano Ramirez MD on 12/9/2021 at 11:49 AM

## 2021-12-14 ENCOUNTER — HOSPITAL ENCOUNTER (EMERGENCY)
Age: 51
Discharge: HOME | End: 2021-12-14
Payer: COMMERCIAL

## 2021-12-14 VITALS
HEIGHT: 67 IN | WEIGHT: 271 LBS | SYSTOLIC BLOOD PRESSURE: 115 MMHG | BODY MASS INDEX: 42.53 KG/M2 | OXYGEN SATURATION: 96 % | DIASTOLIC BLOOD PRESSURE: 59 MMHG | RESPIRATION RATE: 14 BRPM | TEMPERATURE: 98 F | HEART RATE: 83 BPM

## 2021-12-14 DIAGNOSIS — H10.31 ACUTE BACTERIAL CONJUNCTIVITIS OF RIGHT EYE: Primary | ICD-10-CM

## 2021-12-14 DIAGNOSIS — J00 ACUTE NASOPHARYNGITIS: ICD-10-CM

## 2021-12-14 PROCEDURE — 99213 OFFICE O/P EST LOW 20 MIN: CPT

## 2021-12-14 PROCEDURE — 99213 OFFICE O/P EST LOW 20 MIN: CPT | Performed by: NURSE PRACTITIONER

## 2021-12-14 RX ORDER — GENTAMICIN SULFATE 3 MG/ML
1 SOLUTION/ DROPS OPHTHALMIC EVERY 4 HOURS
Qty: 1 EACH | Refills: 0 | Status: SHIPPED | OUTPATIENT
Start: 2021-12-14 | End: 2021-12-21

## 2021-12-14 RX ORDER — DIPHENHYDRAMINE HCL 25 MG
25 CAPSULE ORAL EVERY 6 HOURS PRN
Refills: 0 | COMMUNITY
Start: 2021-12-14 | End: 2021-12-24

## 2021-12-14 RX ORDER — PSEUDOEPHEDRINE HYDROCHLORIDE 30 MG/1
30 TABLET ORAL EVERY 4 HOURS PRN
Refills: 0 | COMMUNITY
Start: 2021-12-14 | End: 2021-12-19

## 2021-12-14 ASSESSMENT — ENCOUNTER SYMPTOMS
EYE REDNESS: 0
SHORTNESS OF BREATH: 0
BLIND SPOTS: 0
EYE PAIN: 0
EYE WATERING: 1
COUGH: 0
APNEA: 0
CRUSTING: 0
DOUBLE VISION: 0
CHOKING: 0
WHEEZING: 0
STRIDOR: 0
BLURRED VISION: 0
CHEST TIGHTNESS: 0
PHOTOPHOBIA: 0
RHINORRHEA: 1
VOMITING: 0
EYE DISCHARGE: 1
NAUSEA: 0
EYE ITCHING: 1
EYE INFLAMMATION: 1
PERI-ORBITAL EDEMA: 1

## 2021-12-14 ASSESSMENT — PAIN SCALES - GENERAL: PAINLEVEL_OUTOF10: 6

## 2021-12-14 ASSESSMENT — PAIN DESCRIPTION - LOCATION: LOCATION: HEAD

## 2021-12-14 NOTE — ED PROVIDER NOTES
[facility]  Urgent Care Encounter      CHIEF COMPLAINT       Chief Complaint   Patient presents with    Sinus Problem    Eye Problem       Nurses Notes reviewed and I agree except as noted in the HPI. HISTORY OFPRESENT ILLNESS   Deanne Bosworth is a 46 y.o. The history is provided by the patient. No  was used. Eye Problem  Location:  Right eye  Quality:  Tearing  Severity:  Mild  Onset quality:  Sudden  Duration:  2 days  Timing:  Constant  Progression:  Worsening  Chronicity:  New  Context: not burn, not chemical exposure, not contact lens problem, not direct trauma, not foreign body, not using machinery, not scratch, not smoke exposure and not UV exposure    Relieved by:  Nothing  Worsened by:  Contact  Ineffective treatments:  Flushing  Associated symptoms: discharge, headaches, inflammation, itching, swelling and tearing    Associated symptoms: no blurred vision, no crusting, no decreased vision, no double vision, no facial rash, no nausea, no numbness, no photophobia, no redness, no scotomas, no tingling, no vomiting and no weakness    Risk factors: no conjunctival hemorrhage, no exposure to pinkeye, no previous injury to eye, no recent herpes zoster and no recent URI        REVIEW OF SYSTEMS     Review of Systems   Constitutional: Negative for activity change, appetite change, chills, diaphoresis, fatigue, fever and unexpected weight change. HENT: Positive for congestion, postnasal drip and rhinorrhea. Eyes: Positive for discharge and itching. Negative for blurred vision, double vision, photophobia, pain, redness and visual disturbance. Respiratory: Negative for apnea, cough, choking, chest tightness, shortness of breath, wheezing and stridor. Cardiovascular: Negative for chest pain, palpitations and leg swelling. Gastrointestinal: Negative for nausea and vomiting. Neurological: Positive for headaches.  Negative for dizziness, tingling, weakness 40 mg by mouth dailyHistorical Med      metFORMIN (GLUCOPHAGE) 500 MG tablet Take 500 mg by mouth dailyHistorical Med      levothyroxine (SYNTHROID) 150 MCG tablet Take 1 tablet by mouth Daily, Disp-90 tablet, R-1Normal      atorvastatin (LIPITOR) 20 MG tablet Take 1 tablet by mouth daily, Disp-90 tablet, R-1Normal      fluticasone (FLONASE) 50 MCG/ACT nasal spray 2 sprays by Nasal route daily Apply daily to each nare, Disp-1 Bottle, R-3Normal      blood glucose monitor strips One Touch Ultra 2  Glucose test strips. Check sugars one time a day and as need for irregular blood glucose Dx: E11.9, Disp-100 strip, R-1, Normal      Insulin Pen Needle 31G X 5 MM MISC Disp-100 each, R-3, NormalUse 2 daily with Byetta Pen. DX:E11.9      blood glucose monitor kit and supplies Test one  times a day & as needed for symptoms of irregular blood glucose. Dx: E11.9 One Touch Ultra 2 glucose meter, Disp-1 kit, R-0, Normal             ALLERGIES     Patient is is allergic to actos [pioglitazone], dilaudid [hydromorphone hcl], morphine, other, and percocet [oxycodone-acetaminophen]. FAMILY HISTORY     Patient's family history includes Breast Cancer in her paternal grandmother; COPD in her mother; Diabetes in her brother, father, mother, and sister; Heart Attack in her brother and father; Heart Disease in her mother; High Blood Pressure in her brother, mother, and sister; Obesity in her brother, mother, and sister; Other in her sister. SOCIAL HISTORY     Patient  reports that she quit smoking about 2 years ago. Her smoking use included cigarettes. She started smoking about 34 years ago. She has a 4.75 pack-year smoking history. She has quit using smokeless tobacco. She reports previous alcohol use. She reports that she does not use drugs. PHYSICAL EXAM     ED TRIAGE VITALS  BP: (!) 115/59, Temp: 98 °F (36.7 °C), Pulse: 83, Resp: 14, SpO2: 96 %  Physical Exam  Vitals and nursing note reviewed.    Constitutional:       General: She is not in acute distress. Appearance: Normal appearance. She is obese. She is not ill-appearing, toxic-appearing or diaphoretic. HENT:      Head: Normocephalic and atraumatic. Right Ear: Tympanic membrane, ear canal and external ear normal. There is no impacted cerumen. Left Ear: Tympanic membrane, ear canal and external ear normal. There is no impacted cerumen. Nose: Congestion and rhinorrhea present. Mouth/Throat:      Mouth: Mucous membranes are moist.      Pharynx: Oropharynx is clear. No oropharyngeal exudate or posterior oropharyngeal erythema. Eyes:      General:         Right eye: Discharge present. Extraocular Movements: Extraocular movements intact. Conjunctiva/sclera: Conjunctivae normal.      Pupils: Pupils are equal, round, and reactive to light. Pulmonary:      Effort: Pulmonary effort is normal. No respiratory distress. Breath sounds: Normal breath sounds. No stridor. No wheezing, rhonchi or rales. Chest:      Chest wall: No tenderness. Musculoskeletal:         General: Normal range of motion. Cervical back: Normal range of motion. Skin:     General: Skin is warm. Neurological:      General: No focal deficit present. Mental Status: She is alert and oriented to person, place, and time. Psychiatric:         Mood and Affect: Mood normal.         Behavior: Behavior normal.         Thought Content: Thought content normal.         Judgment: Judgment normal.         DIAGNOSTIC RESULTS   Labs:No results found for this visit on 12/14/21. IMAGING:  No orders to display     URGENT CARE COURSE:     Vitals:    12/14/21 1406   BP: (!) 115/59   Pulse: 83   Resp: 14   Temp: 98 °F (36.7 °C)   TempSrc: Temporal   SpO2: 96%   Weight: 271 lb (122.9 kg)   Height: 5' 7\" (1.702 m)       Medications - No data to display  PROCEDURES:  None  FINAL IMPRESSION      1. Acute bacterial conjunctivitis of right eye    2.  Acute nasopharyngitis DISPOSITION/PLAN     Wash hands good  Wipe eyes from nose to ear  Monitor for any increase in redness, pain or drainage  Monitor any visual changes  No Contacts x 1 week if patient wear contacts  Follow up with PCP x 48 - 72 hours if no better Drink lots of fluids  Take Motrin or Tylenol for headache  Humidification of air  Use nasal spray as directed  Take a nasal decongestant as directed  Monitor for any fever increased and sinus pain or pressure  Follow-up see her primary care provider in 48 hours  Or go to the emergency department for any changes or concerns.     PATIENT REFERRED TO:  Denver Belcher MD  [facility] [place] [address]  [phone]    Schedule an appointment as soon as possible for a visit       DISCHARGE MEDICATIONS:  Discharge Medication List as of 12/14/2021  2:29 PM      START taking these medications    Details   gentamicin (GARAMYCIN) 0.3 % ophthalmic solution Place 1 drop into the right eye every 4 hours for 7 days, Disp-1 each, R-0Normal      pseudoephedrine (SUDAFED) 30 MG tablet Take 1 tablet by mouth every 4 hours as needed for Congestion, R-0OTC      diphenhydrAMINE (BENADRYL) 25 MG capsule Take 1 capsule by mouth every 6 hours as needed for Itching or Allergies, R-0OTC           Discharge Medication List as of 12/14/2021  2:29 PM          Nonda Randy, APRN - CNP          Nonda Randy, APRN - CNP  12/14/21 6347

## 2021-12-14 NOTE — ED NOTES
Presents with c/o sinus drainage and pressure and right eye swelling, redness and itching x 2-3 days.      Shavonne Starkey RN  12/14/21 7211

## 2021-12-28 ENCOUNTER — HOSPITAL ENCOUNTER (OUTPATIENT)
Dept: WOMENS IMAGING | Age: 51
Discharge: HOME | End: 2021-12-28
Payer: COMMERCIAL

## 2021-12-28 DIAGNOSIS — Z12.31 VISIT FOR SCREENING MAMMOGRAM: ICD-10-CM

## 2021-12-28 PROCEDURE — 77063 BREAST TOMOSYNTHESIS BI: CPT

## 2022-02-20 ENCOUNTER — HOSPITAL ENCOUNTER (EMERGENCY)
Age: 52
Discharge: HOME | End: 2022-02-20
Attending: FAMILY MEDICINE
Payer: COMMERCIAL

## 2022-02-20 ENCOUNTER — APPOINTMENT (OUTPATIENT)
Dept: CT IMAGING | Age: 52
End: 2022-02-20
Payer: COMMERCIAL

## 2022-02-20 VITALS
SYSTOLIC BLOOD PRESSURE: 120 MMHG | DIASTOLIC BLOOD PRESSURE: 65 MMHG | HEART RATE: 78 BPM | OXYGEN SATURATION: 98 % | WEIGHT: 274 LBS | RESPIRATION RATE: 18 BRPM | TEMPERATURE: 98.2 F | BODY MASS INDEX: 42.91 KG/M2

## 2022-02-20 DIAGNOSIS — Z98.1 STATUS POST LAMINECTOMY WITH SPINAL FUSION: ICD-10-CM

## 2022-02-20 DIAGNOSIS — T81.31XA: ICD-10-CM

## 2022-02-20 DIAGNOSIS — G89.29 ACUTE EXACERBATION OF CHRONIC LOW BACK PAIN: Primary | ICD-10-CM

## 2022-02-20 DIAGNOSIS — M54.50 ACUTE EXACERBATION OF CHRONIC LOW BACK PAIN: Primary | ICD-10-CM

## 2022-02-20 LAB
ALBUMIN SERPL-MCNC: 3.9 G/DL (ref 3.5–5.1)
ALP BLD-CCNC: 131 U/L (ref 38–126)
ALT SERPL-CCNC: 16 U/L (ref 11–66)
ANION GAP SERPL CALCULATED.3IONS-SCNC: 10 MEQ/L (ref 8–16)
AST SERPL-CCNC: 16 U/L (ref 5–40)
BASOPHILS # BLD: 1 %
BASOPHILS ABSOLUTE: 0.1 THOU/MM3 (ref 0–0.1)
BILIRUB SERPL-MCNC: 0.2 MG/DL (ref 0.3–1.2)
BILIRUBIN DIRECT: < 0.2 MG/DL (ref 0–0.3)
BUN BLDV-MCNC: 11 MG/DL (ref 7–22)
CALCIUM SERPL-MCNC: 9.1 MG/DL (ref 8.5–10.5)
CHLORIDE BLD-SCNC: 97 MEQ/L (ref 98–111)
CO2: 27 MEQ/L (ref 23–33)
CREAT SERPL-MCNC: 0.5 MG/DL (ref 0.4–1.2)
EOSINOPHIL # BLD: 4.1 %
EOSINOPHILS ABSOLUTE: 0.3 THOU/MM3 (ref 0–0.4)
ERYTHROCYTE [DISTWIDTH] IN BLOOD BY AUTOMATED COUNT: 13.2 % (ref 11.5–14.5)
ERYTHROCYTE [DISTWIDTH] IN BLOOD BY AUTOMATED COUNT: 43 FL (ref 35–45)
GFR SERPL CREATININE-BSD FRML MDRD: > 90 ML/MIN/1.73M2
GLUCOSE BLD-MCNC: 111 MG/DL (ref 70–108)
HCT VFR BLD CALC: 34.5 % (ref 37–47)
HEMOGLOBIN: 10.4 GM/DL (ref 12–16)
IMMATURE GRANS (ABS): 0.01 THOU/MM3 (ref 0–0.07)
IMMATURE GRANULOCYTES: 0.1 %
LACTIC ACID: 1.2 MMOL/L (ref 0.5–2)
LYMPHOCYTES # BLD: 40.7 %
LYMPHOCYTES ABSOLUTE: 3.4 THOU/MM3 (ref 1–4.8)
MCH RBC QN AUTO: 27.2 PG (ref 26–33)
MCHC RBC AUTO-ENTMCNC: 30.1 GM/DL (ref 32.2–35.5)
MCV RBC AUTO: 90.3 FL (ref 81–99)
MONOCYTES # BLD: 6.9 %
MONOCYTES ABSOLUTE: 0.6 THOU/MM3 (ref 0.4–1.3)
NUCLEATED RED BLOOD CELLS: 0 /100 WBC
OSMOLALITY CALCULATION: 268.3 MOSMOL/KG (ref 275–300)
PLATELET # BLD: 453 THOU/MM3 (ref 130–400)
PMV BLD AUTO: 8.7 FL (ref 9.4–12.4)
POTASSIUM REFLEX MAGNESIUM: 3.8 MEQ/L (ref 3.5–5.2)
RBC # BLD: 3.82 MILL/MM3 (ref 4.2–5.4)
SEG NEUTROPHILS: 47.2 %
SEGMENTED NEUTROPHILS ABSOLUTE COUNT: 4 THOU/MM3 (ref 1.8–7.7)
SODIUM BLD-SCNC: 134 MEQ/L (ref 135–145)
TOTAL PROTEIN: 7.7 G/DL (ref 6.1–8)
WBC # BLD: 8.4 THOU/MM3 (ref 4.8–10.8)

## 2022-02-20 PROCEDURE — 83605 ASSAY OF LACTIC ACID: CPT

## 2022-02-20 PROCEDURE — 96374 THER/PROPH/DIAG INJ IV PUSH: CPT

## 2022-02-20 PROCEDURE — 72132 CT LUMBAR SPINE W/DYE: CPT

## 2022-02-20 PROCEDURE — 2580000003 HC RX 258: Performed by: STUDENT IN AN ORGANIZED HEALTH CARE EDUCATION/TRAINING PROGRAM

## 2022-02-20 PROCEDURE — 80076 HEPATIC FUNCTION PANEL: CPT

## 2022-02-20 PROCEDURE — 6360000004 HC RX CONTRAST MEDICATION: Performed by: FAMILY MEDICINE

## 2022-02-20 PROCEDURE — 80048 BASIC METABOLIC PNL TOTAL CA: CPT

## 2022-02-20 PROCEDURE — 6360000002 HC RX W HCPCS: Performed by: STUDENT IN AN ORGANIZED HEALTH CARE EDUCATION/TRAINING PROGRAM

## 2022-02-20 PROCEDURE — 99284 EMERGENCY DEPT VISIT MOD MDM: CPT

## 2022-02-20 PROCEDURE — 85025 COMPLETE CBC W/AUTO DIFF WBC: CPT

## 2022-02-20 RX ORDER — FENTANYL CITRATE 50 UG/ML
50 INJECTION, SOLUTION INTRAMUSCULAR; INTRAVENOUS ONCE
Status: COMPLETED | OUTPATIENT
Start: 2022-02-20 | End: 2022-02-20

## 2022-02-20 RX ORDER — 0.9 % SODIUM CHLORIDE 0.9 %
1000 INTRAVENOUS SOLUTION INTRAVENOUS ONCE
Status: COMPLETED | OUTPATIENT
Start: 2022-02-20 | End: 2022-02-20

## 2022-02-20 RX ADMIN — FENTANYL CITRATE 50 MCG: 50 INJECTION INTRAMUSCULAR; INTRAVENOUS at 16:52

## 2022-02-20 RX ADMIN — IOPAMIDOL 80 ML: 755 INJECTION, SOLUTION INTRAVENOUS at 18:07

## 2022-02-20 RX ADMIN — SODIUM CHLORIDE 1000 ML: 9 INJECTION, SOLUTION INTRAVENOUS at 16:56

## 2022-02-20 ASSESSMENT — PAIN DESCRIPTION - FREQUENCY
FREQUENCY: CONTINUOUS

## 2022-02-20 ASSESSMENT — PAIN DESCRIPTION - LOCATION
LOCATION: BACK

## 2022-02-20 ASSESSMENT — PAIN SCALES - GENERAL
PAINLEVEL_OUTOF10: 5
PAINLEVEL_OUTOF10: 3
PAINLEVEL_OUTOF10: 2
PAINLEVEL_OUTOF10: 7
PAINLEVEL_OUTOF10: 3

## 2022-02-20 ASSESSMENT — PAIN DESCRIPTION - DESCRIPTORS
DESCRIPTORS: ACHING
DESCRIPTORS: ACHING
DESCRIPTORS: ACHING;NUMBNESS
DESCRIPTORS: ACHING

## 2022-02-20 ASSESSMENT — ENCOUNTER SYMPTOMS
APNEA: 0
COUGH: 0
CHEST TIGHTNESS: 0
ABDOMINAL PAIN: 0
VOMITING: 0
STRIDOR: 0
NAUSEA: 0
DIARRHEA: 0
WHEEZING: 0
BACK PAIN: 1
CHOKING: 0
PHOTOPHOBIA: 0
SHORTNESS OF BREATH: 0

## 2022-02-20 ASSESSMENT — PAIN DESCRIPTION - ORIENTATION
ORIENTATION: LEFT
ORIENTATION: LEFT

## 2022-02-20 ASSESSMENT — PAIN - FUNCTIONAL ASSESSMENT: PAIN_FUNCTIONAL_ASSESSMENT: 0-10

## 2022-02-20 ASSESSMENT — PAIN DESCRIPTION - PAIN TYPE
TYPE: ACUTE PAIN

## 2022-02-20 ASSESSMENT — PAIN DESCRIPTION - PROGRESSION: CLINICAL_PROGRESSION: GRADUALLY IMPROVING

## 2022-02-20 NOTE — ED PROVIDER NOTES
[address] [address]          Pt Name: Cheryl Dick  MRN: [id number]  [place] 1970  Date of evaluation: 2/20/2022  Treating Resident Physician: Juanis Aaron MD  Supervising Physician: Sherice Obrien MD.     [address] [place] [address]       Chief Complaint   Patient presents with    Back Pain     post op lower back pain     History obtained from the patient. HISTORY OF PRESENT ILLNESS    HPI  Cheryl Dick is a 46 y.o. female who presents to the emergency department for evaluation of back pain. Patient refers today in the morning working had a severe pain located in the lower back radiated both sides on all the way down both legs associated with \"pop\" sensation to reach she started feeling instability in her spine. Additionally patient refers worsening in bilateral leg weakness and numbness in tights. Patient is concerned about her recent lumbar surgery, refers she feels however moving and noise while walking. Also she refers since 4 days ago warm sensation in surgical wound with leak of scant fluid. Patient denies fever, no chills, no recent trauma. The patient has no other acute complaints at this time. REVIEW OF SYSTEMS   Review of Systems   Constitutional: Negative for appetite change, chills, diaphoresis, fatigue and unexpected weight change. HENT: Negative. Eyes: Negative for photophobia and visual disturbance. Respiratory: Negative for apnea, cough, choking, chest tightness, shortness of breath, wheezing and stridor. Cardiovascular: Negative for chest pain, palpitations and leg swelling. Gastrointestinal: Negative for abdominal pain, diarrhea, nausea and vomiting. Genitourinary: Negative for dysuria, flank pain, hematuria and urgency. Musculoskeletal: Positive for back pain and gait problem. Negative for joint swelling, myalgias and neck pain. Allergic/Immunologic: Negative for immunocompromised state. Neurological: Negative for dizziness, tremors, seizures, syncope, facial asymmetry, speech difficulty, weakness, light-headedness, numbness and headaches. Psychiatric/Behavioral: Negative for confusion. The patient is not nervous/anxious. PAST MEDICAL AND SURGICAL HISTORY     Past Medical History:   Diagnosis Date    Anxiety     Back problem     Chronic back pain     Chronic sinus infection     Constipation     Fibromyalgia     GERD (gastroesophageal reflux disease)     H/O cold sores     acyclovir prn    Hyperlipidemia     Hypothyroid     thyroiditis    Insomnia     Kidney stones     Malabsorption     really just gastric sleeve    Obesity     Obsessive compulsive disorder     Prolonged emergence from general anesthesia     Restless leg syndrome     Sleep apnea     wears CPAP every night - Dr. Shan Landaverde incontinence     Type II or unspecified type diabetes mellitus without mention of complication, not stated as uncontrolled     Vitamin D deficiency      Past Surgical History:   Procedure Laterality Date    CARPAL TUNNEL RELEASE Bilateral      SECTION  2005    FOOT SURGERY      right foot    GASTRIC RESTRICTION SURGERY  2016    gastric sleeve    KIDNEY STONE SURGERY  2000    lithotripsy    KNEE ARTHROSCOPY Left 2018    Dr. Cristina Oconnor  2014    SHOULDER SURGERY Right 2009    SPINAL FUSION  2011    L4-S1 fusion - [facility]    UPPER GASTROINTESTINAL ENDOSCOPY  2016    VAGINAL PROLAPSE REPAIR      RECTAL PROLAPSE         MEDICATIONS   No current facility-administered medications for this encounter.     Current Outpatient Medications:     venlafaxine (EFFEXOR XR) 150 MG extended release capsule, Take 1 capsule by mouth daily, Disp: 90 capsule, Rfl: 0    traZODone (DESYREL) 100 MG tablet, Take 2 tablets by mouth nightly, Disp: 180 tablet, Rfl: 0    pregabalin (LYRICA) 200 MG capsule, Take 1 capsule by mouth nightly for 90 days. , Disp: 90 capsule, Rfl: 0    Semaglutide,0.25 or 0.5MG/DOS, (OZEMPIC, 0.25 OR 0.5 MG/DOSE,) 2 MG/1.5ML SOPN, Inject 0.5 mg into the skin once a week, Disp: , Rfl:     FARXIGA 10 MG tablet, Take 10 mg by mouth every morning, Disp: , Rfl:     pantoprazole (PROTONIX) 40 MG tablet, Take 40 mg by mouth daily, Disp: , Rfl:     metFORMIN (GLUCOPHAGE) 500 MG tablet, Take 500 mg by mouth daily, Disp: , Rfl:     fluticasone (FLONASE) 50 MCG/ACT nasal spray, 2 sprays by Nasal route daily Apply daily to each nare, Disp: 1 Bottle, Rfl: 3    levothyroxine (SYNTHROID) 150 MCG tablet, Take 1 tablet by mouth Daily (Patient taking differently: Take 188 mcg by mouth Daily ), Disp: 90 tablet, Rfl: 1    atorvastatin (LIPITOR) 20 MG tablet, Take 1 tablet by mouth daily, Disp: 90 tablet, Rfl: 1    blood glucose monitor strips, One Touch Ultra 2  Glucose test strips. Check sugars one time a day and as need for irregular blood glucose Dx: E11.9, Disp: 100 strip, Rfl: 1    Insulin Pen Needle 31G X 5 MM MISC, Use 2 daily with Byetta Pen. DX:E11.9, Disp: 100 each, Rfl: 3    blood glucose monitor kit and supplies, Test one  times a day & as needed for symptoms of irregular blood glucose.  Dx: E11.9 One Touch Ultra 2 glucose meter, Disp: 1 kit, Rfl: 0      SOCIAL HISTORY     Social History     Social History Narrative    Not on file     Social History     Tobacco Use    Smoking status: Former Smoker     Packs/day: 0.25     Years: 19.00     Pack years: 4.75     Types: Cigarettes     Start date: 6/17/1987     Quit date: 2019     Years since quitting: 3.1    Smokeless tobacco: Former User   Vaping Use    Vaping Use: Former    Start date: 8/12/2016    Quit date: 9/12/2016   Substance Use Topics    Alcohol use: Not Currently     Alcohol/week: 0.0 standard drinks     Comment: occasionally    Drug use: No         ALLERGIES     Allergies   Allergen Reactions    Actos [Pioglitazone]      Edema      Dilaudid [Hydromorphone Hcl] Itching     Per [facility] RN, patient gets itchy with dilaudid (no hives); had a dose in PACU today with no issues.  Morphine Itching    Other      Outdated food like ketchup and peanut butter cause itching    Percocet [Oxycodone-Acetaminophen]      Makes pt pace         FAMILY HISTORY     Family History   Problem Relation Age of Onset    Diabetes Mother     High Blood Pressure Mother     Heart Disease Mother     Obesity Mother     COPD Mother     Diabetes Father     Heart Attack Father     Diabetes Sister     Obesity Sister     Other Sister         [place]    High Blood Pressure Sister     High Blood Pressure Brother     Obesity Brother     Heart Attack Brother     Breast Cancer Paternal Grandmother 72    Diabetes Brother     Stroke Neg Hx     Cancer Neg Hx          PREVIOUS RECORDS   Previous records reviewed: Previous medical history of diabetes, chronic back pain, L2-3 stenosis L5-S1 stenosis, lumbar fusion January 12, current medications include Lyrica, metformin, Keflex/Bactrim. PHYSICAL EXAM     ED Triage Vitals [02/20/22 1558]   BP Temp Temp src Pulse Resp SpO2 Height Weight   138/76 -- -- 81 18 99 % -- 274 lb (124.3 kg)     Initial vital signs and nursing assessment reviewed and normal. Body mass index is 42.91 kg/m². Pulsoximetry is normal per my interpretation. Additional Vital Signs:  Vitals:    02/20/22 1931   BP: 120/65   Pulse:    Resp:    Temp:    SpO2: 98%       Physical Exam  Constitutional:       General: She is not in acute distress. Appearance: Normal appearance. She is not ill-appearing, toxic-appearing or diaphoretic. HENT:      Head: Normocephalic and atraumatic. Nose: Nose normal.      Mouth/Throat:      Mouth: Mucous membranes are moist.   Eyes:      Extraocular Movements: Extraocular movements intact. Pupils: Pupils are equal, round, and reactive to light.    Cardiovascular:      Rate and Rhythm: Normal rate and regular rhythm. Pulses: Normal pulses. Heart sounds: Normal heart sounds. No murmur heard. No friction rub. No gallop. Pulmonary:      Effort: Pulmonary effort is normal. No respiratory distress. Breath sounds: Normal breath sounds. No stridor. No wheezing, rhonchi or rales. Chest:      Chest wall: No tenderness. Abdominal:      General: Abdomen is flat. There is no distension. Palpations: Abdomen is soft. There is no mass. Tenderness: There is no abdominal tenderness. There is no guarding or rebound. Hernia: No hernia is present. Musculoskeletal:         General: Swelling and tenderness (Bilateral  lumbar sacral region with mild redness around surgical wound and small dehydration's in upper part 1 cm with no fluid secretion. No deformities. no Crepitus) present. No deformity. Cervical back: Normal range of motion and neck supple. No rigidity or tenderness. Skin:     General: Skin is warm. Capillary Refill: Capillary refill takes less than 2 seconds. Neurological:      General: No focal deficit present. Mental Status: She is alert and oriented to person, place, and time. Sensory: Sensory deficit (Both thights simetrical lateral face) present. Motor: No weakness. Psychiatric:         Mood and Affect: Mood normal.             MEDICAL DECISION MAKING   Initial Assessment:   3 46year-old patient complaining of back pain, pop sensation and bilateral leg weakness while walking; after which started feeling instability, recent surgical procedure in lumbar spine, physical examination shows nonseptic patient with 1 cm wound dehiscence with no signs of infection with no symptoms or evidence to physical examination of acute radicular compression. We will ask for CT lumbar spine in order to rule out abscess or hardware misplacement: We will include acute inflammatory reactants as well as symptomatic treatment.   2. Clinical impression  a. Post lumbar fusion/wound dehiscence  b. Epidural abscess rule out  c. Hardware misplacement rule out  Plan:    CT lumbar spine lumbar reconstruction   CBC, BMP, hepatic function panel, lactic acid   IV Fluids/fentanyl   Reassessment        ED RESULTS   Laboratory results:  Labs Reviewed   CBC WITH AUTO DIFFERENTIAL - Abnormal; Notable for the following components:       Result Value    RBC 3.82 (*)     Hemoglobin 10.4 (*)     Hematocrit 34.5 (*)     MCHC 30.1 (*)     Platelets 932 (*)     MPV 8.7 (*)     All other components within normal limits   BASIC METABOLIC PANEL W/ REFLEX TO MG FOR LOW K - Abnormal; Notable for the following components:    Sodium 134 (*)     Chloride 97 (*)     Glucose 111 (*)     All other components within normal limits   HEPATIC FUNCTION PANEL - Abnormal; Notable for the following components: Total Bilirubin 0.2 (*)     Alkaline Phosphatase 131 (*)     All other components within normal limits   OSMOLALITY - Abnormal; Notable for the following components:    Osmolality Calc 268.3 (*)     All other components within normal limits   LACTIC ACID   ANION GAP   GLOMERULAR FILTRATION RATE, ESTIMATED       Radiologic studies results:  CT LUMBAR SPINE W CONTRAST   Final Result   1. Patient is status post prior posterior lumbar fusion at L2-L3. The surgical hardware appears grossly intact. Laminectomies are demonstrated at the L2-S1 levels. 2. There is a collection of fluid along the posterior aspect of the laminectomy bed dorsal to the thecal sac measured approximately 6.1 x 2.9 cm in the oblique transverse and AP dimensions extending from the L2-S1 levels. 3. More superficially, there is also a longitudinally oriented collection of fluid at the same levels measured approximately 2 x 1.4 cm seen on sagittal series 92928 image 31.      4. The sterility of these collections is indeterminate. **This report has been created using voice recognition software.   It may contain minor errors which are inherent in voice recognition technology. **      Final report electronically signed by Dr. Gracy Layne on 2/20/2022 6:43 PM          ED Medications administered this visit:   Medications   0.9 % sodium chloride bolus (0 mLs IntraVENous Stopped 2/20/22 1824)   fentaNYL (SUBLIMAZE) injection 50 mcg (50 mcg IntraVENous Given 2/20/22 1652)   iopamidol (ISOVUE-370) 76 % injection 80 mL (80 mLs IntraVENous Given 2/20/22 1807)         ED COURSE       Reassessment  Refers mild improvement initial symptoms, currently with no SIRS criteria, acute inflammatory reactants are not elevated, CT lumbar spine shows collection #2 with no compromise of surgeries hardware; Surgeon spine on-call Dr. Pina Smiley is contacted, case was presented explaining findings to physical examination as well has lab work/radiology images. We agreed patient can be discharged with alarm signs, recommendations, follow-up tomorrow morning Dr. Roslyn Lee office. Also patient refers she called previously Dr. Roslyn Lee office during that they may be able to see her tomorrow morning. Patient understands and agrees      Strict return precautions and follow up instructions were discussed with the patient prior to discharge, with which the patient agrees. MEDICATION CHANGES     New Prescriptions    No medications on file         FINAL DISPOSITION     Final diagnoses:   Acute exacerbation of chronic low back pain   Dehiscence of laminectomy incision, initial encounter   Status post laminectomy with spinal fusion     Condition: condition: good  Dispo: Discharge to home      This transcription was electronically signed. Parts of this transcriptions may have been dictated by use of voice recognition software and electronically transcribed, and parts may have been transcribed with the assistance of an ED scribe. The transcription may contain errors not detected in proofreading.   Please refer to my supervising physician's documentation if my documentation differs.     Electronically Signed: Curly Wong MD, 02/20/22, 7:38 PM       Curly Wong MD  Resident  02/20/22 0958

## 2022-02-20 NOTE — ED NOTES
Pt found in the bathroom, advised to call for help when using bathroom, pt agreed.      Salty Lopez RN  02/20/22 2751

## 2022-02-20 NOTE — ED TRIAGE NOTES
Pt reports to ED c/o lower back pain, middle and left side radiating down both legs that started aprox 2 hours ago. Pt report felling a pop while walking, pain started instantly, radiates down the legs, also weakness in legs. Pt reports second spinal fusion surgery on Jan 12, first one in 2011. Pt reports old hardware replaced with new one, old cages kept. Pt reports she fells hardware moving, hears the noise while walking and feels  \"crunches\". Pt concerned the hardware is out of place. Pt alert and oriented, breathing even and unlabored, pt denies any other discomfort.

## 2022-03-10 ENCOUNTER — HOSPITAL ENCOUNTER (OUTPATIENT)
Dept: MRI IMAGING | Age: 52
Discharge: HOME | End: 2022-03-10

## 2022-03-10 DIAGNOSIS — Z47.89 ENCOUNTER FOR OTHER ORTHOPEDIC AFTERCARE: ICD-10-CM

## 2022-03-10 NOTE — PROGRESS NOTES
1000: Patient arrived ambulatory with  for MRI with sedation. Upon arrival patient asking if anesthesia talks to her now or later. I explained anesthesia is not involved and this is just under twilight sedation. Patient stated she was told this would be under general and that she would be completely knocked out. Spoke to Herman in MRI and stated we cannot put patient under general anesthesia. .  Explained to patient it will be twilight sedation and she stated she has already tried twilight and it didn't work and that she doesn't want to proceed. Discharge instructions explained to patient, voiced understanding. Patient discharged with  ambulatory.

## 2022-03-16 DIAGNOSIS — G89.4 CHRONIC PAIN SYNDROME: ICD-10-CM

## 2022-03-16 RX ORDER — PREGABALIN 200 MG/1
CAPSULE ORAL
Qty: 90 CAPSULE | OUTPATIENT
Start: 2022-03-16

## 2022-04-19 ENCOUNTER — ANESTHESIA EVENT (OUTPATIENT)
Dept: OPERATING ROOM | Age: 52
DRG: 857 | End: 2022-04-19
Payer: COMMERCIAL

## 2022-04-19 ENCOUNTER — HOSPITAL ENCOUNTER (INPATIENT)
Age: 52
LOS: 1 days | Discharge: HOME | DRG: 857 | End: 2022-04-20
Attending: FAMILY MEDICINE | Admitting: ORTHOPAEDIC SURGERY
Payer: COMMERCIAL

## 2022-04-19 ENCOUNTER — ANESTHESIA (OUTPATIENT)
Dept: OPERATING ROOM | Age: 52
DRG: 857 | End: 2022-04-19
Payer: COMMERCIAL

## 2022-04-19 VITALS — DIASTOLIC BLOOD PRESSURE: 67 MMHG | OXYGEN SATURATION: 100 % | SYSTOLIC BLOOD PRESSURE: 118 MMHG

## 2022-04-19 DIAGNOSIS — M54.59 INTRACTABLE LOW BACK PAIN: Primary | ICD-10-CM

## 2022-04-19 DIAGNOSIS — D72.829 LEUKOCYTOSIS, UNSPECIFIED TYPE: ICD-10-CM

## 2022-04-19 DIAGNOSIS — B99.9 SOFT TISSUE INFECTION OF LUMBAR SPINE: ICD-10-CM

## 2022-04-19 DIAGNOSIS — M79.89 SOFT TISSUE INFECTION OF LUMBAR SPINE: ICD-10-CM

## 2022-04-19 DIAGNOSIS — G89.18 POST-OP PAIN: ICD-10-CM

## 2022-04-19 LAB
ANION GAP SERPL CALCULATED.3IONS-SCNC: 12 MEQ/L (ref 8–16)
BASOPHILS # BLD: 0.4 %
BASOPHILS ABSOLUTE: 0.1 THOU/MM3 (ref 0–0.1)
BUN BLDV-MCNC: 14 MG/DL (ref 7–22)
CALCIUM SERPL-MCNC: 9.5 MG/DL (ref 8.5–10.5)
CHLORIDE BLD-SCNC: 98 MEQ/L (ref 98–111)
CO2: 26 MEQ/L (ref 23–33)
CREAT SERPL-MCNC: 0.7 MG/DL (ref 0.4–1.2)
EOSINOPHIL # BLD: 1 %
EOSINOPHILS ABSOLUTE: 0.1 THOU/MM3 (ref 0–0.4)
ERYTHROCYTE [DISTWIDTH] IN BLOOD BY AUTOMATED COUNT: 15.3 % (ref 11.5–14.5)
ERYTHROCYTE [DISTWIDTH] IN BLOOD BY AUTOMATED COUNT: 45.6 FL (ref 35–45)
GFR SERPL CREATININE-BSD FRML MDRD: 87 ML/MIN/1.73M2
GLUCOSE BLD-MCNC: 133 MG/DL (ref 70–108)
GLUCOSE BLD-MCNC: 143 MG/DL (ref 70–108)
GLUCOSE BLD-MCNC: 219 MG/DL (ref 70–108)
HCT VFR BLD CALC: 37.9 % (ref 37–47)
HEMOGLOBIN: 11.3 GM/DL (ref 12–16)
IMMATURE GRANS (ABS): 0.06 THOU/MM3 (ref 0–0.07)
IMMATURE GRANULOCYTES: 0.4 %
LYMPHOCYTES # BLD: 26.5 %
LYMPHOCYTES ABSOLUTE: 3.8 THOU/MM3 (ref 1–4.8)
MCH RBC QN AUTO: 24.6 PG (ref 26–33)
MCHC RBC AUTO-ENTMCNC: 29.8 GM/DL (ref 32.2–35.5)
MCV RBC AUTO: 82.6 FL (ref 81–99)
MONOCYTES # BLD: 8.2 %
MONOCYTES ABSOLUTE: 1.2 THOU/MM3 (ref 0.4–1.3)
NUCLEATED RED BLOOD CELLS: 0 /100 WBC
OSMOLALITY CALCULATION: 274.9 MOSMOL/KG (ref 275–300)
PLATELET # BLD: 568 THOU/MM3 (ref 130–400)
PMV BLD AUTO: 9.3 FL (ref 9.4–12.4)
POTASSIUM REFLEX MAGNESIUM: 4.3 MEQ/L (ref 3.5–5.2)
RBC # BLD: 4.59 MILL/MM3 (ref 4.2–5.4)
SEG NEUTROPHILS: 63.5 %
SEGMENTED NEUTROPHILS ABSOLUTE COUNT: 9 THOU/MM3 (ref 1.8–7.7)
SODIUM BLD-SCNC: 136 MEQ/L (ref 135–145)
WBC # BLD: 14.2 THOU/MM3 (ref 4.8–10.8)

## 2022-04-19 PROCEDURE — 87186 SC STD MICRODIL/AGAR DIL: CPT

## 2022-04-19 PROCEDURE — 7100000001 HC PACU RECOVERY - ADDTL 15 MIN: Performed by: ORTHOPAEDIC SURGERY

## 2022-04-19 PROCEDURE — 2580000003 HC RX 258: Performed by: NURSE ANESTHETIST, CERTIFIED REGISTERED

## 2022-04-19 PROCEDURE — 6360000002 HC RX W HCPCS: Performed by: ORTHOPAEDIC SURGERY

## 2022-04-19 PROCEDURE — 99285 EMERGENCY DEPT VISIT HI MDM: CPT

## 2022-04-19 PROCEDURE — 6370000000 HC RX 637 (ALT 250 FOR IP): Performed by: INTERNAL MEDICINE

## 2022-04-19 PROCEDURE — 6360000002 HC RX W HCPCS: Performed by: NURSE ANESTHETIST, CERTIFIED REGISTERED

## 2022-04-19 PROCEDURE — 3600000013 HC SURGERY LEVEL 3 ADDTL 15MIN: Performed by: ORTHOPAEDIC SURGERY

## 2022-04-19 PROCEDURE — 3700000001 HC ADD 15 MINUTES (ANESTHESIA): Performed by: ORTHOPAEDIC SURGERY

## 2022-04-19 PROCEDURE — 96376 TX/PRO/DX INJ SAME DRUG ADON: CPT

## 2022-04-19 PROCEDURE — 1200000000 HC SEMI PRIVATE

## 2022-04-19 PROCEDURE — 87070 CULTURE OTHR SPECIMN AEROBIC: CPT

## 2022-04-19 PROCEDURE — 6360000002 HC RX W HCPCS

## 2022-04-19 PROCEDURE — 96374 THER/PROPH/DIAG INJ IV PUSH: CPT

## 2022-04-19 PROCEDURE — 82948 REAGENT STRIP/BLOOD GLUCOSE: CPT

## 2022-04-19 PROCEDURE — 87077 CULTURE AEROBIC IDENTIFY: CPT

## 2022-04-19 PROCEDURE — 6360000002 HC RX W HCPCS: Performed by: FAMILY MEDICINE

## 2022-04-19 PROCEDURE — 96375 TX/PRO/DX INJ NEW DRUG ADDON: CPT

## 2022-04-19 PROCEDURE — 2580000003 HC RX 258

## 2022-04-19 PROCEDURE — 85025 COMPLETE CBC W/AUTO DIFF WBC: CPT

## 2022-04-19 PROCEDURE — 3700000000 HC ANESTHESIA ATTENDED CARE: Performed by: ORTHOPAEDIC SURGERY

## 2022-04-19 PROCEDURE — 2500000003 HC RX 250 WO HCPCS: Performed by: NURSE ANESTHETIST, CERTIFIED REGISTERED

## 2022-04-19 PROCEDURE — 2709999900 HC NON-CHARGEABLE SUPPLY: Performed by: ORTHOPAEDIC SURGERY

## 2022-04-19 PROCEDURE — 87147 CULTURE TYPE IMMUNOLOGIC: CPT

## 2022-04-19 PROCEDURE — 0QB00ZZ EXCISION OF LUMBAR VERTEBRA, OPEN APPROACH: ICD-10-PCS | Performed by: ORTHOPAEDIC SURGERY

## 2022-04-19 PROCEDURE — 7100000000 HC PACU RECOVERY - FIRST 15 MIN: Performed by: ORTHOPAEDIC SURGERY

## 2022-04-19 PROCEDURE — 6370000000 HC RX 637 (ALT 250 FOR IP)

## 2022-04-19 PROCEDURE — 6360000002 HC RX W HCPCS: Performed by: ANESTHESIOLOGY

## 2022-04-19 PROCEDURE — 3600000003 HC SURGERY LEVEL 3 BASE: Performed by: ORTHOPAEDIC SURGERY

## 2022-04-19 PROCEDURE — 7100000011 HC PHASE II RECOVERY - ADDTL 15 MIN: Performed by: ORTHOPAEDIC SURGERY

## 2022-04-19 PROCEDURE — 7100000010 HC PHASE II RECOVERY - FIRST 15 MIN: Performed by: ORTHOPAEDIC SURGERY

## 2022-04-19 PROCEDURE — 80048 BASIC METABOLIC PNL TOTAL CA: CPT

## 2022-04-19 PROCEDURE — 6370000000 HC RX 637 (ALT 250 FOR IP): Performed by: PHYSICIAN ASSISTANT

## 2022-04-19 PROCEDURE — 87075 CULTR BACTERIA EXCEPT BLOOD: CPT

## 2022-04-19 PROCEDURE — 87205 SMEAR GRAM STAIN: CPT

## 2022-04-19 RX ORDER — ACETAMINOPHEN 325 MG/1
650 TABLET ORAL EVERY 6 HOURS PRN
Status: DISCONTINUED | OUTPATIENT
Start: 2022-04-19 | End: 2022-04-20 | Stop reason: HOSPADM

## 2022-04-19 RX ORDER — SODIUM CHLORIDE 9 MG/ML
INJECTION, SOLUTION INTRAVENOUS CONTINUOUS PRN
Status: DISCONTINUED | OUTPATIENT
Start: 2022-04-19 | End: 2022-04-21 | Stop reason: SDUPTHER

## 2022-04-19 RX ORDER — HYDROCODONE BITARTRATE AND ACETAMINOPHEN 5; 325 MG/1; MG/1
2 TABLET ORAL EVERY 6 HOURS PRN
Status: DISCONTINUED | OUTPATIENT
Start: 2022-04-19 | End: 2022-04-20 | Stop reason: HOSPADM

## 2022-04-19 RX ORDER — PHENYLEPHRINE HYDROCHLORIDE 10 MG/ML
INJECTION INTRAVENOUS PRN
Status: DISCONTINUED | OUTPATIENT
Start: 2022-04-19 | End: 2022-04-21 | Stop reason: SDUPTHER

## 2022-04-19 RX ORDER — DIPHENHYDRAMINE HYDROCHLORIDE 50 MG/ML
25 INJECTION INTRAMUSCULAR; INTRAVENOUS ONCE
Status: COMPLETED | OUTPATIENT
Start: 2022-04-19 | End: 2022-04-19

## 2022-04-19 RX ORDER — OXYCODONE HYDROCHLORIDE 5 MG/1
5 TABLET ORAL EVERY 6 HOURS PRN
Status: DISCONTINUED | OUTPATIENT
Start: 2022-04-19 | End: 2022-04-19

## 2022-04-19 RX ORDER — TRAZODONE HYDROCHLORIDE 100 MG/1
200 TABLET ORAL NIGHTLY
Status: DISCONTINUED | OUTPATIENT
Start: 2022-04-19 | End: 2022-04-20 | Stop reason: HOSPADM

## 2022-04-19 RX ORDER — GLYCOPYRROLATE 1 MG/5 ML
SYRINGE (ML) INTRAVENOUS PRN
Status: DISCONTINUED | OUTPATIENT
Start: 2022-04-19 | End: 2022-04-21 | Stop reason: SDUPTHER

## 2022-04-19 RX ORDER — HYDROCODONE BITARTRATE AND ACETAMINOPHEN 5; 325 MG/1; MG/1
2 TABLET ORAL EVERY 4 HOURS PRN
Status: DISCONTINUED | OUTPATIENT
Start: 2022-04-19 | End: 2022-04-19

## 2022-04-19 RX ORDER — LEVOTHYROXINE SODIUM 0.15 MG/1
150 TABLET ORAL DAILY
Status: DISCONTINUED | OUTPATIENT
Start: 2022-04-20 | End: 2022-04-20 | Stop reason: HOSPADM

## 2022-04-19 RX ORDER — DIPHENHYDRAMINE HYDROCHLORIDE 50 MG/ML
INJECTION INTRAMUSCULAR; INTRAVENOUS
Status: COMPLETED
Start: 2022-04-19 | End: 2022-04-19

## 2022-04-19 RX ORDER — NEOSTIGMINE METHYLSULFATE 5 MG/5 ML
SYRINGE (ML) INTRAVENOUS PRN
Status: DISCONTINUED | OUTPATIENT
Start: 2022-04-19 | End: 2022-04-21 | Stop reason: SDUPTHER

## 2022-04-19 RX ORDER — ONDANSETRON 4 MG/1
4 TABLET, ORALLY DISINTEGRATING ORAL EVERY 8 HOURS PRN
Status: DISCONTINUED | OUTPATIENT
Start: 2022-04-19 | End: 2022-04-20 | Stop reason: HOSPADM

## 2022-04-19 RX ORDER — CEFAZOLIN SODIUM 1 G/3ML
INJECTION, POWDER, FOR SOLUTION INTRAMUSCULAR; INTRAVENOUS PRN
Status: DISCONTINUED | OUTPATIENT
Start: 2022-04-19 | End: 2022-04-21 | Stop reason: SDUPTHER

## 2022-04-19 RX ORDER — HYDROCODONE BITARTRATE AND ACETAMINOPHEN 5; 325 MG/1; MG/1
1 TABLET ORAL EVERY 6 HOURS PRN
Status: DISCONTINUED | OUTPATIENT
Start: 2022-04-19 | End: 2022-04-19

## 2022-04-19 RX ORDER — SODIUM CHLORIDE 0.9 % (FLUSH) 0.9 %
5-40 SYRINGE (ML) INJECTION PRN
Status: DISCONTINUED | OUTPATIENT
Start: 2022-04-19 | End: 2022-04-19 | Stop reason: HOSPADM

## 2022-04-19 RX ORDER — SUCCINYLCHOLINE/SOD CL,ISO/PF 200MG/10ML
SYRINGE (ML) INTRAVENOUS PRN
Status: DISCONTINUED | OUTPATIENT
Start: 2022-04-19 | End: 2022-04-21 | Stop reason: SDUPTHER

## 2022-04-19 RX ORDER — GLIPIZIDE 5 MG/1
5 TABLET ORAL EVERY MORNING
COMMUNITY

## 2022-04-19 RX ORDER — CYCLOBENZAPRINE HCL 10 MG
10 TABLET ORAL 3 TIMES DAILY PRN
Status: DISCONTINUED | OUTPATIENT
Start: 2022-04-19 | End: 2022-04-20 | Stop reason: HOSPADM

## 2022-04-19 RX ORDER — FENTANYL CITRATE 50 UG/ML
INJECTION, SOLUTION INTRAMUSCULAR; INTRAVENOUS PRN
Status: DISCONTINUED | OUTPATIENT
Start: 2022-04-19 | End: 2022-04-21 | Stop reason: SDUPTHER

## 2022-04-19 RX ORDER — DEXAMETHASONE SODIUM PHOSPHATE 10 MG/ML
INJECTION, EMULSION INTRAMUSCULAR; INTRAVENOUS PRN
Status: DISCONTINUED | OUTPATIENT
Start: 2022-04-19 | End: 2022-04-21 | Stop reason: SDUPTHER

## 2022-04-19 RX ORDER — SODIUM CHLORIDE 0.9 % (FLUSH) 0.9 %
5-40 SYRINGE (ML) INJECTION PRN
Status: DISCONTINUED | OUTPATIENT
Start: 2022-04-19 | End: 2022-04-20 | Stop reason: HOSPADM

## 2022-04-19 RX ORDER — VENLAFAXINE HYDROCHLORIDE 150 MG/1
150 CAPSULE, EXTENDED RELEASE ORAL DAILY
Status: DISCONTINUED | OUTPATIENT
Start: 2022-04-19 | End: 2022-04-20 | Stop reason: HOSPADM

## 2022-04-19 RX ORDER — DIPHENHYDRAMINE HCL 25 MG
25 TABLET ORAL EVERY 6 HOURS PRN
Status: DISCONTINUED | OUTPATIENT
Start: 2022-04-19 | End: 2022-04-20 | Stop reason: HOSPADM

## 2022-04-19 RX ORDER — MIDAZOLAM HYDROCHLORIDE 1 MG/ML
INJECTION INTRAMUSCULAR; INTRAVENOUS PRN
Status: DISCONTINUED | OUTPATIENT
Start: 2022-04-19 | End: 2022-04-21 | Stop reason: SDUPTHER

## 2022-04-19 RX ORDER — LIDOCAINE HYDROCHLORIDE 20 MG/ML
INJECTION, SOLUTION INTRAVENOUS PRN
Status: DISCONTINUED | OUTPATIENT
Start: 2022-04-19 | End: 2022-04-21 | Stop reason: SDUPTHER

## 2022-04-19 RX ORDER — SODIUM CHLORIDE 9 MG/ML
INJECTION, SOLUTION INTRAVENOUS CONTINUOUS
Status: DISCONTINUED | OUTPATIENT
Start: 2022-04-19 | End: 2022-04-20 | Stop reason: HOSPADM

## 2022-04-19 RX ORDER — SODIUM CHLORIDE 9 MG/ML
25 INJECTION, SOLUTION INTRAVENOUS PRN
Status: DISCONTINUED | OUTPATIENT
Start: 2022-04-19 | End: 2022-04-20 | Stop reason: HOSPADM

## 2022-04-19 RX ORDER — HYDROCODONE BITARTRATE AND ACETAMINOPHEN 5; 325 MG/1; MG/1
1 TABLET ORAL EVERY 6 HOURS PRN
Status: DISCONTINUED | OUTPATIENT
Start: 2022-04-19 | End: 2022-04-20 | Stop reason: HOSPADM

## 2022-04-19 RX ORDER — MORPHINE SULFATE 2 MG/ML
2 INJECTION, SOLUTION INTRAMUSCULAR; INTRAVENOUS EVERY 4 HOURS PRN
Status: DISCONTINUED | OUTPATIENT
Start: 2022-04-19 | End: 2022-04-19

## 2022-04-19 RX ORDER — ONDANSETRON 2 MG/ML
4 INJECTION INTRAMUSCULAR; INTRAVENOUS
Status: DISCONTINUED | OUTPATIENT
Start: 2022-04-19 | End: 2022-04-19 | Stop reason: HOSPADM

## 2022-04-19 RX ORDER — ONDANSETRON 2 MG/ML
4 INJECTION INTRAMUSCULAR; INTRAVENOUS EVERY 6 HOURS PRN
Status: DISCONTINUED | OUTPATIENT
Start: 2022-04-19 | End: 2022-04-20 | Stop reason: HOSPADM

## 2022-04-19 RX ORDER — SODIUM CHLORIDE 9 MG/ML
INJECTION, SOLUTION INTRAVENOUS PRN
Status: DISCONTINUED | OUTPATIENT
Start: 2022-04-19 | End: 2022-04-19 | Stop reason: HOSPADM

## 2022-04-19 RX ORDER — ONDANSETRON 2 MG/ML
INJECTION INTRAMUSCULAR; INTRAVENOUS PRN
Status: DISCONTINUED | OUTPATIENT
Start: 2022-04-19 | End: 2022-04-21 | Stop reason: SDUPTHER

## 2022-04-19 RX ORDER — SODIUM CHLORIDE 0.9 % (FLUSH) 0.9 %
5-40 SYRINGE (ML) INJECTION EVERY 12 HOURS SCHEDULED
Status: DISCONTINUED | OUTPATIENT
Start: 2022-04-19 | End: 2022-04-20 | Stop reason: HOSPADM

## 2022-04-19 RX ORDER — DIPHENHYDRAMINE HYDROCHLORIDE 50 MG/ML
25 INJECTION INTRAMUSCULAR; INTRAVENOUS
Status: COMPLETED | OUTPATIENT
Start: 2022-04-19 | End: 2022-04-19

## 2022-04-19 RX ORDER — PANTOPRAZOLE SODIUM 40 MG/1
40 TABLET, DELAYED RELEASE ORAL DAILY
Status: DISCONTINUED | OUTPATIENT
Start: 2022-04-20 | End: 2022-04-19

## 2022-04-19 RX ORDER — PROPOFOL 10 MG/ML
INJECTION, EMULSION INTRAVENOUS PRN
Status: DISCONTINUED | OUTPATIENT
Start: 2022-04-19 | End: 2022-04-21 | Stop reason: SDUPTHER

## 2022-04-19 RX ORDER — VANCOMYCIN HYDROCHLORIDE 1 G/20ML
INJECTION, POWDER, LYOPHILIZED, FOR SOLUTION INTRAVENOUS PRN
Status: DISCONTINUED | OUTPATIENT
Start: 2022-04-19 | End: 2022-04-19 | Stop reason: HOSPADM

## 2022-04-19 RX ORDER — HYDRALAZINE HYDROCHLORIDE 20 MG/ML
10 INJECTION INTRAMUSCULAR; INTRAVENOUS
Status: DISCONTINUED | OUTPATIENT
Start: 2022-04-19 | End: 2022-04-19 | Stop reason: HOSPADM

## 2022-04-19 RX ORDER — FLUTICASONE PROPIONATE 50 MCG
2 SPRAY, SUSPENSION (ML) NASAL DAILY
Status: DISCONTINUED | OUTPATIENT
Start: 2022-04-19 | End: 2022-04-20 | Stop reason: HOSPADM

## 2022-04-19 RX ORDER — ATORVASTATIN CALCIUM 20 MG/1
20 TABLET, FILM COATED ORAL DAILY
Status: DISCONTINUED | OUTPATIENT
Start: 2022-04-19 | End: 2022-04-20 | Stop reason: HOSPADM

## 2022-04-19 RX ORDER — ROCURONIUM BROMIDE 10 MG/ML
INJECTION, SOLUTION INTRAVENOUS PRN
Status: DISCONTINUED | OUTPATIENT
Start: 2022-04-19 | End: 2022-04-21 | Stop reason: SDUPTHER

## 2022-04-19 RX ORDER — PANTOPRAZOLE SODIUM 40 MG/1
40 TABLET, DELAYED RELEASE ORAL 2 TIMES DAILY
Status: DISCONTINUED | OUTPATIENT
Start: 2022-04-19 | End: 2022-04-20 | Stop reason: HOSPADM

## 2022-04-19 RX ORDER — SODIUM CHLORIDE 0.9 % (FLUSH) 0.9 %
5-40 SYRINGE (ML) INJECTION EVERY 12 HOURS SCHEDULED
Status: DISCONTINUED | OUTPATIENT
Start: 2022-04-19 | End: 2022-04-19 | Stop reason: HOSPADM

## 2022-04-19 RX ORDER — HYDROCODONE BITARTRATE AND ACETAMINOPHEN 5; 325 MG/1; MG/1
1 TABLET ORAL EVERY 4 HOURS PRN
Status: DISCONTINUED | OUTPATIENT
Start: 2022-04-19 | End: 2022-04-19

## 2022-04-19 RX ORDER — LABETALOL 20 MG/4 ML (5 MG/ML) INTRAVENOUS SYRINGE
10
Status: DISCONTINUED | OUTPATIENT
Start: 2022-04-19 | End: 2022-04-19 | Stop reason: HOSPADM

## 2022-04-19 RX ADMIN — Medication 0.6 MG: at 15:55

## 2022-04-19 RX ADMIN — ATORVASTATIN CALCIUM 20 MG: 20 TABLET, FILM COATED ORAL at 22:15

## 2022-04-19 RX ADMIN — HYDROMORPHONE HYDROCHLORIDE 0.5 MG: 1 INJECTION, SOLUTION INTRAMUSCULAR; INTRAVENOUS; SUBCUTANEOUS at 16:47

## 2022-04-19 RX ADMIN — HYDROCODONE BITARTRATE AND ACETAMINOPHEN 1 TABLET: 5; 325 TABLET ORAL at 21:37

## 2022-04-19 RX ADMIN — HYDROMORPHONE HYDROCHLORIDE 0.5 MG: 1 INJECTION, SOLUTION INTRAMUSCULAR; INTRAVENOUS; SUBCUTANEOUS at 16:18

## 2022-04-19 RX ADMIN — Medication 4 MG: at 15:55

## 2022-04-19 RX ADMIN — HYDROMORPHONE HYDROCHLORIDE 1 MG: 1 INJECTION, SOLUTION INTRAMUSCULAR; INTRAVENOUS; SUBCUTANEOUS at 13:05

## 2022-04-19 RX ADMIN — Medication 140 MG: at 14:30

## 2022-04-19 RX ADMIN — HYDROMORPHONE HYDROCHLORIDE 0.5 MG: 1 INJECTION, SOLUTION INTRAMUSCULAR; INTRAVENOUS; SUBCUTANEOUS at 16:23

## 2022-04-19 RX ADMIN — DIPHENHYDRAMINE HYDROCHLORIDE 25 MG: 50 INJECTION INTRAMUSCULAR; INTRAVENOUS at 16:26

## 2022-04-19 RX ADMIN — CEFAZOLIN 3000 MG: 10 INJECTION, POWDER, FOR SOLUTION INTRAVENOUS at 23:38

## 2022-04-19 RX ADMIN — SODIUM CHLORIDE: 9 INJECTION, SOLUTION INTRAVENOUS at 16:04

## 2022-04-19 RX ADMIN — DIPHENHYDRAMINE HYDROCHLORIDE 25 MG: 50 INJECTION, SOLUTION INTRAMUSCULAR; INTRAVENOUS at 11:12

## 2022-04-19 RX ADMIN — PANTOPRAZOLE SODIUM 40 MG: 40 TABLET, DELAYED RELEASE ORAL at 22:15

## 2022-04-19 RX ADMIN — HYDROMORPHONE HYDROCHLORIDE 0.5 MG: 1 INJECTION, SOLUTION INTRAMUSCULAR; INTRAVENOUS; SUBCUTANEOUS at 16:42

## 2022-04-19 RX ADMIN — TRAZODONE HYDROCHLORIDE 200 MG: 100 TABLET ORAL at 21:36

## 2022-04-19 RX ADMIN — CYCLOBENZAPRINE 10 MG: 10 TABLET, FILM COATED ORAL at 23:33

## 2022-04-19 RX ADMIN — LIDOCAINE HYDROCHLORIDE 100 MG: 20 INJECTION, SOLUTION INTRAVENOUS at 14:30

## 2022-04-19 RX ADMIN — VENLAFAXINE HYDROCHLORIDE 150 MG: 150 CAPSULE, EXTENDED RELEASE ORAL at 22:15

## 2022-04-19 RX ADMIN — MIDAZOLAM 2 MG: 1 INJECTION INTRAMUSCULAR; INTRAVENOUS at 14:27

## 2022-04-19 RX ADMIN — CEFAZOLIN 2000 MG: 1 INJECTION, POWDER, FOR SOLUTION INTRAMUSCULAR; INTRAVENOUS at 15:19

## 2022-04-19 RX ADMIN — ROCURONIUM BROMIDE 30 MG: 50 INJECTION, SOLUTION INTRAVENOUS at 14:37

## 2022-04-19 RX ADMIN — ONDANSETRON 4 MG: 2 INJECTION INTRAMUSCULAR; INTRAVENOUS at 14:37

## 2022-04-19 RX ADMIN — PROPOFOL 150 MG: 10 INJECTION, EMULSION INTRAVENOUS at 14:30

## 2022-04-19 RX ADMIN — DEXAMETHASONE SODIUM PHOSPHATE 8 MG: 10 INJECTION, EMULSION INTRAMUSCULAR; INTRAVENOUS at 14:37

## 2022-04-19 RX ADMIN — SODIUM CHLORIDE: 9 INJECTION, SOLUTION INTRAVENOUS at 14:37

## 2022-04-19 RX ADMIN — PHENYLEPHRINE HYDROCHLORIDE 100 MCG: 10 INJECTION INTRAVENOUS at 15:08

## 2022-04-19 RX ADMIN — FENTANYL CITRATE 100 MCG: 50 INJECTION, SOLUTION INTRAMUSCULAR; INTRAVENOUS at 14:27

## 2022-04-19 RX ADMIN — METFORMIN HYDROCHLORIDE 1000 MG: 500 TABLET ORAL at 23:36

## 2022-04-19 RX ADMIN — PREGABALIN 200 MG: 75 CAPSULE ORAL at 21:36

## 2022-04-19 RX ADMIN — OXYCODONE HYDROCHLORIDE 5 MG: 5 TABLET ORAL at 17:55

## 2022-04-19 RX ADMIN — HYDROMORPHONE HYDROCHLORIDE 1 MG: 1 INJECTION, SOLUTION INTRAMUSCULAR; INTRAVENOUS; SUBCUTANEOUS at 11:12

## 2022-04-19 RX ADMIN — DIPHENHYDRAMINE HCL 25 MG: 25 TABLET ORAL at 21:36

## 2022-04-19 RX ADMIN — PHENYLEPHRINE HYDROCHLORIDE 100 MCG: 10 INJECTION INTRAVENOUS at 15:20

## 2022-04-19 RX ADMIN — PHENYLEPHRINE HYDROCHLORIDE 200 MCG: 10 INJECTION INTRAVENOUS at 15:27

## 2022-04-19 RX ADMIN — DIPHENHYDRAMINE HYDROCHLORIDE 25 MG: 50 INJECTION, SOLUTION INTRAMUSCULAR; INTRAVENOUS at 13:05

## 2022-04-19 ASSESSMENT — PAIN DESCRIPTION - ORIENTATION
ORIENTATION: MID;LOWER
ORIENTATION: MID
ORIENTATION: MID;LOWER
ORIENTATION: LOWER

## 2022-04-19 ASSESSMENT — PAIN DESCRIPTION - PAIN TYPE
TYPE: SURGICAL PAIN

## 2022-04-19 ASSESSMENT — PAIN DESCRIPTION - ONSET
ONSET: ON-GOING

## 2022-04-19 ASSESSMENT — PULMONARY FUNCTION TESTS
PIF_VALUE: 21
PIF_VALUE: 18
PIF_VALUE: 20
PIF_VALUE: 21
PIF_VALUE: 8
PIF_VALUE: 21
PIF_VALUE: 8
PIF_VALUE: 21
PIF_VALUE: 21
PIF_VALUE: 3
PIF_VALUE: 20
PIF_VALUE: 21
PIF_VALUE: 21
PIF_VALUE: 19
PIF_VALUE: 20
PIF_VALUE: 18
PIF_VALUE: 21
PIF_VALUE: 21
PIF_VALUE: 20
PIF_VALUE: 21
PIF_VALUE: 20
PIF_VALUE: 21
PIF_VALUE: 17
PIF_VALUE: 21
PIF_VALUE: 19
PIF_VALUE: 1
PIF_VALUE: 18
PIF_VALUE: 21
PIF_VALUE: 8
PIF_VALUE: 18
PIF_VALUE: 21
PIF_VALUE: 20
PIF_VALUE: 21
PIF_VALUE: 18
PIF_VALUE: 19
PIF_VALUE: 21
PIF_VALUE: 1
PIF_VALUE: 21
PIF_VALUE: 20
PIF_VALUE: 21
PIF_VALUE: 21
PIF_VALUE: 19
PIF_VALUE: 21
PIF_VALUE: 1
PIF_VALUE: 5
PIF_VALUE: 1
PIF_VALUE: 4
PIF_VALUE: 20
PIF_VALUE: 21
PIF_VALUE: 18
PIF_VALUE: 21
PIF_VALUE: 21
PIF_VALUE: 19
PIF_VALUE: 21
PIF_VALUE: 3
PIF_VALUE: 21
PIF_VALUE: 12
PIF_VALUE: 18
PIF_VALUE: 21
PIF_VALUE: 21
PIF_VALUE: 20
PIF_VALUE: 21
PIF_VALUE: 21
PIF_VALUE: 18
PIF_VALUE: 21
PIF_VALUE: 20
PIF_VALUE: 21
PIF_VALUE: 20
PIF_VALUE: 21
PIF_VALUE: 17
PIF_VALUE: 21
PIF_VALUE: 18
PIF_VALUE: 20
PIF_VALUE: 20
PIF_VALUE: 21
PIF_VALUE: 21
PIF_VALUE: 18
PIF_VALUE: 18
PIF_VALUE: 21
PIF_VALUE: 2
PIF_VALUE: 21
PIF_VALUE: 20
PIF_VALUE: 21
PIF_VALUE: 15

## 2022-04-19 ASSESSMENT — PAIN DESCRIPTION - DIRECTION: RADIATING_TOWARDS: DOWN LEG

## 2022-04-19 ASSESSMENT — PAIN - FUNCTIONAL ASSESSMENT
PAIN_FUNCTIONAL_ASSESSMENT: ACTIVITIES ARE NOT PREVENTED
PAIN_FUNCTIONAL_ASSESSMENT: PREVENTS OR INTERFERES SOME ACTIVE ACTIVITIES AND ADLS
PAIN_FUNCTIONAL_ASSESSMENT: 0-10

## 2022-04-19 ASSESSMENT — PAIN DESCRIPTION - LOCATION
LOCATION: BACK

## 2022-04-19 ASSESSMENT — PAIN DESCRIPTION - FREQUENCY
FREQUENCY: CONTINUOUS
FREQUENCY: CONTINUOUS

## 2022-04-19 ASSESSMENT — PAIN SCALES - GENERAL
PAINLEVEL_OUTOF10: 5
PAINLEVEL_OUTOF10: 4
PAINLEVEL_OUTOF10: 9
PAINLEVEL_OUTOF10: 3
PAINLEVEL_OUTOF10: 7
PAINLEVEL_OUTOF10: 9
PAINLEVEL_OUTOF10: 4
PAINLEVEL_OUTOF10: 6
PAINLEVEL_OUTOF10: 8
PAINLEVEL_OUTOF10: 5
PAINLEVEL_OUTOF10: 6
PAINLEVEL_OUTOF10: 6
PAINLEVEL_OUTOF10: 8
PAINLEVEL_OUTOF10: 4

## 2022-04-19 ASSESSMENT — PAIN DESCRIPTION - DESCRIPTORS
DESCRIPTORS: RADIATING;BURNING
DESCRIPTORS: SHARP
DESCRIPTORS: ACHING

## 2022-04-19 ASSESSMENT — PAIN DESCRIPTION - PROGRESSION
CLINICAL_PROGRESSION: NOT CHANGED
CLINICAL_PROGRESSION: NOT CHANGED

## 2022-04-19 NOTE — ED NOTES
Medicated per orders. Admitting PA at bedside. Resp regular. Call light in reach.       Preston Lobato RN  04/19/22 5354

## 2022-04-19 NOTE — ANESTHESIA PRE PROCEDURE
Department of Anesthesiology  Preprocedure Note       Name:  Rea Clark   Age:  46 y.o.  :  1970                                          MRN:  [id number]         Date:  2022      Surgeon: Anitha Martin):  Linh Mandel MD    Procedure: Procedure(s):  LUMBAR WOUND INCISION AND DRAINAGE    Medications prior to admission:   Prior to Admission medications    Medication Sig Start Date End Date Taking? Authorizing Provider   venlafaxine (EFFEXOR XR) 150 MG extended release capsule Take 1 capsule by mouth daily 3/30/22   Griselda Severino MD   traZODone (DESYREL) 100 MG tablet Take 2 tablets by mouth nightly 3/30/22   Griselda Severino MD   pregabalin (LYRICA) 200 MG capsule Take 1 capsule by mouth nightly for 90 days. 3/30/22 6/28/22  Griselda Severino MD   Semaglutide,0.25 or 0.5MG/DOS, (OZEMPIC, 0.25 OR 0.5 MG/DOSE,) 2 MG/1.5ML SOPN Inject 0.5 mg into the skin once a week    Historical Provider, MD   FARXIGA 10 MG tablet Take 10 mg by mouth every morning 10/13/20   Historical Provider, MD   pantoprazole (PROTONIX) 40 MG tablet Take 40 mg by mouth daily 20   Historical Provider, MD   metFORMIN (GLUCOPHAGE) 500 MG tablet Take 500 mg by mouth daily    Historical Provider, MD   fluticasone (FLONASE) 50 MCG/ACT nasal spray 2 sprays by Nasal route daily Apply daily to each nare 19   Gardenia Ford MD   levothyroxine (SYNTHROID) 150 MCG tablet Take 1 tablet by mouth Daily  Patient taking differently: Take 188 mcg by mouth Daily  19   Gardenia Ford MD   atorvastatin (LIPITOR) 20 MG tablet Take 1 tablet by mouth daily 19   Gardenia Ford MD   blood glucose monitor strips One Touch Ultra 2  Glucose test strips. Check sugars one time a day and as need for irregular blood glucose Dx: E11.9 19   Gardenia Ford MD   Insulin Pen Needle 31G X 5 MM MISC Use 2 daily with Byetta Pen.  DX:E11.9 19   Gardenia Ford MD   blood glucose monitor kit and supplies Test one  times a day & as needed for symptoms of irregular blood glucose. Dx: E11.9 One Touch Ultra 2 glucose meter 18   Doreen Jimenez MD       Current medications:    No current facility-administered medications for this encounter. Allergies: Allergies   Allergen Reactions    Actos [Pioglitazone]      Edema      Dilaudid [Hydromorphone Hcl] Itching     Per Onita Board RN, patient gets itchy with dilaudid (no hives); had a dose in PACU today with no issues.      Morphine Itching    Other      Outdated food like ketchup and peanut butter cause itching    Percocet [Oxycodone-Acetaminophen]      Makes pt pace       Problem List:    Patient Active Problem List   Diagnosis Code    Hyperlipidemia E78.5    Hypothyroidism E03.9    Morbid obesity due to excess calories ([facility] [address] E66.01    Morbid obesity with BMI of 50.0-59.9, adult ([facility] [address] E66.01, Z68.43    Gastroesophageal reflux disease K21.9    Obstructive sleep apnea G47.33    Diabetes mellitus without complication ([facility] [address] S13.5       Past Medical History:        Diagnosis Date    Anxiety     Back problem     Chronic back pain     Chronic sinus infection     Constipation     Fibromyalgia     GERD (gastroesophageal reflux disease)     H/O cold sores     acyclovir prn    Hyperlipidemia     Hypothyroid     thyroiditis    Insomnia     Kidney stones     Malabsorption     really just gastric sleeve    Obesity     Obsessive compulsive disorder     Prolonged emergence from general anesthesia     Restless leg syndrome     Sleep apnea     wears CPAP every night - Dr. Aline Favre incontinence     Type II or unspecified type diabetes mellitus without mention of complication, not stated as uncontrolled     Vitamin D deficiency        Past Surgical History:        Procedure Laterality Date    CARPAL TUNNEL RELEASE Bilateral 2001     SECTION  2005    FOOT SURGERY      right foot    GASTRIC RESTRICTION SURGERY  2016    gastric sleeve    KIDNEY STONE SURGERY  2000    lithotripsy    KNEE ARTHROSCOPY Left 06/2018    Dr. José Manuel Cope  2014    SHOULDER SURGERY Right 2009    SPINAL FUSION  2011    L4-S1 fusion - [facility]    UPPER GASTROINTESTINAL ENDOSCOPY  04/2016    VAGINAL PROLAPSE REPAIR  2003    RECTAL PROLAPSE       Social History:    Social History     Tobacco Use    Smoking status: Former Smoker     Packs/day: 0.25     Years: 19.00     Pack years: 4.75     Types: Cigarettes     Start date: 6/17/1987     Quit date: 2019     Years since quitting: 3.2    Smokeless tobacco: Former User   Substance Use Topics    Alcohol use: Not Currently     Alcohol/week: 0.0 standard drinks     Comment: occasionally                                Counseling given: Not Answered      Vital Signs (Current):   Vitals:    04/19/22 1023 04/19/22 1118 04/19/22 1235 04/19/22 1310   BP: 122/87 (!) 153/67  (!) 111/57   Pulse: 106 97  97   Resp: 16 16  18   Temp: 99.4 °F (37.4 °C)  99.6 °F (37.6 °C)    TempSrc: Oral  Oral    SpO2: 97% 95%  95%   Weight: 271 lb (122.9 kg)      Height: 5' 7\" (1.702 m)                                                 BP Readings from Last 3 Encounters:   04/19/22 (!) 111/57   02/20/22 120/65   12/14/21 (!) 115/59       NPO Status:                                                                                 BMI:   Wt Readings from Last 3 Encounters:   04/19/22 271 lb (122.9 kg)   02/20/22 274 lb (124.3 kg)   12/14/21 271 lb (122.9 kg)     Body mass index is 42.44 kg/m².     CBC:   Lab Results   Component Value Date    WBC 14.2 04/19/2022    RBC 4.59 04/19/2022    HGB 11.3 04/19/2022    HCT 37.9 04/19/2022    MCV 82.6 04/19/2022    RDW 13.8 06/15/2016     04/19/2022       CMP:   Lab Results   Component Value Date     04/19/2022    K 4.3 04/19/2022    CL 98 04/19/2022    CO2 26 04/19/2022    BUN 14 04/19/2022    CREATININE 0.7 04/19/2022    LABGLOM 87 04/19/2022 GLUCOSE 143 04/19/2022    PROT 7.7 02/20/2022    CALCIUM 9.5 04/19/2022    BILITOT 0.2 02/20/2022    ALKPHOS 131 02/20/2022    AST 16 02/20/2022    ALT 16 02/20/2022       POC Tests: No results for input(s): POCGLU, POCNA, POCK, POCCL, POCBUN, POCHEMO, POCHCT in the last 72 hours. Coags: No results found for: PROTIME, INR, APTT    HCG (If Applicable): No results found for: PREGTESTUR, PREGSERUM, HCG, HCGQUANT     ABGs: No results found for: PHART, PO2ART, LKO6GMF, FYW6ILD, BEART, D5NCUZKF     Type & Screen (If Applicable):  No results found for: LABABO, LABRH    Drug/Infectious Status (If Applicable):  No results found for: HIV, HEPCAB    COVID-19 Screening (If Applicable): No results found for: COVID19        Anesthesia Evaluation   no history of anesthetic complications:   Airway: Mallampati: II  TM distance: >3 FB   Neck ROM: full  Mouth opening: > = 3 FB Dental:          Pulmonary:normal exam    (+) sleep apnea: on CPAP,            Patient did not smoke on day of surgery. Cardiovascular:  Exercise tolerance: good (>4 METS),                     Neuro/Psych:   (+) psychiatric history:            GI/Hepatic/Renal:   (+) GERD:,           Endo/Other:    (+) Diabetes, hypothyroidism::., .          Pt had no PAT visit       Abdominal:             Vascular: Other Findings:             Anesthesia Plan      general     ASA 3       Induction: intravenous. MIPS: Postoperative opioids intended and Prophylactic antiemetics administered. Anesthetic plan and risks discussed with patient. Plan discussed with CRNA.                   Reginald Suero MD   4/19/2022

## 2022-04-19 NOTE — H&P
History and Physical    Beena Sánchez (1970)  4/19/2022      CHIEF COMPLAINT:  Back pain and wound drainage    HISTORY OF PRESENT ILLNESS:                The patient is a 46 y.o. female who presents with above chief complaint. She c/o constant, severe back pain that started Saturday evening. She also c/o drainage at incision site and open wound. She admits to chills last night as well as low grade fever in the ER, Tmax 99.6. She reports wound has been draining since her lumbar surgery on 3/29/22. She is s/p lumbar I&D with L2-S1 revision decompression. Back pain radiates to the right buttock and down the posterolateral thigh. She c/o numbness along the lateral side of the leg from the hip down through the toes. Leg pain and numbness has persisted since her last surgery. Current VAS score 8/10. Percentage wise, she reports pain occurs 75% in the back and 25% in the legs. As it pertains to the legs, 100% right and 0% left. Activities that aggravate pain include standing and walking. Pt's pain improves with lying on her left side. She denies bladder or bowel dysfunction or saddle anesthesia. Patient is a former smoker.      Past Medical History:        Diagnosis Date    Anxiety     Back problem     Chronic back pain     Chronic sinus infection     Constipation     Fibromyalgia     GERD (gastroesophageal reflux disease)     H/O cold sores     acyclovir prn    Hyperlipidemia     Hypothyroid     thyroiditis    Insomnia     Kidney stones 2000    Malabsorption     really just gastric sleeve    Obesity     Obsessive compulsive disorder     Prolonged emergence from general anesthesia     Restless leg syndrome     Sleep apnea     wears CPAP every night - Dr. Sarah Blood incontinence     Type II or unspecified type diabetes mellitus without mention of complication, not stated as uncontrolled     Vitamin D deficiency      Past Surgical History:        Procedure Laterality Date    CARPAL TUNNEL RELEASE Bilateral 2001     SECTION  2005    FOOT SURGERY      right foot    GASTRIC RESTRICTION SURGERY  2016    gastric sleeve    KIDNEY STONE SURGERY      lithotripsy    KNEE ARTHROSCOPY Left 2018    Dr. Sarah Campbell  2014    SHOULDER SURGERY Right 2009   [place] SPINAL FUSION  2011    L4-S1 fusion - [facility]    UPPER GASTROINTESTINAL ENDOSCOPY  2016    VAGINAL PROLAPSE REPAIR      RECTAL PROLAPSE     Current Medications:   Prior to Admission medications    Medication Sig Start Date End Date Taking? Authorizing Provider   venlafaxine (EFFEXOR XR) 150 MG extended release capsule Take 1 capsule by mouth daily 3/30/22   Teri Oglesby MD   traZODone (DESYREL) 100 MG tablet Take 2 tablets by mouth nightly 3/30/22   Teri Oglesby MD   pregabalin (LYRICA) 200 MG capsule Take 1 capsule by mouth nightly for 90 days. 3/30/22 6/28/22  Teri Oglesby MD   Semaglutide,0.25 or 0.5MG/DOS, (OZEMPIC, 0.25 OR 0.5 MG/DOSE,) 2 MG/1.5ML SOPN Inject 0.5 mg into the skin once a week    Historical Provider, MD   FARXIGA 10 MG tablet Take 10 mg by mouth every morning 10/13/20   Historical Provider, MD   pantoprazole (PROTONIX) 40 MG tablet Take 40 mg by mouth daily 20   Historical Provider, MD   metFORMIN (GLUCOPHAGE) 500 MG tablet Take 500 mg by mouth daily    Historical Provider, MD   fluticasone (FLONASE) 50 MCG/ACT nasal spray 2 sprays by Nasal route daily Apply daily to each nare 19   Talisha Martinez MD   levothyroxine (SYNTHROID) 150 MCG tablet Take 1 tablet by mouth Daily  Patient taking differently: Take 188 mcg by mouth Daily  19   Talisha Martinez MD   atorvastatin (LIPITOR) 20 MG tablet Take 1 tablet by mouth daily 19   Talisha Martinez MD   blood glucose monitor strips One Touch Ultra 2  Glucose test strips.    Check sugars one time a day and as need for irregular blood glucose Dx: E11.9 19 Rickey Hudson MD   Insulin Pen Needle 31G X 5 MM MISC Use 2 daily with Byetta Pen. DX:E11.9 4/4/19   Rickey Hudson MD   blood glucose monitor kit and supplies Test one  times a day & as needed for symptoms of irregular blood glucose. Dx: E11.9 One Touch Ultra 2 glucose meter 7/19/18   Rickey Hudson MD    D@  Allergies:  Actos [pioglitazone], Dilaudid [hydromorphone hcl], Morphine, Other, and Percocet [oxycodone-acetaminophen]    Social History:   TOBACCO:   reports that she quit smoking about 3 years ago. Her smoking use included cigarettes. She started smoking about 34 years ago. She has a 4.75 pack-year smoking history. She has quit using smokeless tobacco.  ETOH:   reports previous alcohol use. DRUGS:   reports no history of drug use. Family History:       Problem Relation Age of Onset    Diabetes Mother     High Blood Pressure Mother     Heart Disease Mother     Obesity Mother     COPD Mother     Diabetes Father     Heart Attack Father     Diabetes Sister     Obesity Sister     Other Sister         M[place]O    High Blood Pressure Sister     High Blood Pressure Brother     Obesity Brother     Heart Attack Brother     Breast Cancer Paternal Grandmother 72    Diabetes Brother     Stroke Neg Hx     Cancer Neg Hx        REVIEW OF SYSTEMS:    CONSTITUTIONAL:  Positive for low grade fever and chills. negative for fatigue and weight loss  RESPIRATORY:  negative for cough with sputum and dyspnea  CARDIOVASCULAR:  negative for chest pain, dyspnea, exertional chest pressure/discomfort  GASTROINTESTINAL:  negative for nausea, vomiting, diarrhea and abdominal pain  GENITOURINARY:  negative for frequency and urinary incontinence  MUSCULOSKELETAL:  positive for back Pain and leg pain  NEUROLOGICAL:  Positive for RLE numbness.  negative for headaches, dizziness and syncope  BEHAVIOR/PSYCH:  negative for depressed mood, increased agitation and anxiety    PHYSICAL EXAM:      CONSTITUTIONAL:  awake, alert, cooperative, no apparent distress, and appears stated age  EYES:  Lids and lashes normal, pupils equal, round and reactive to light, extra ocular muscles intact, sclera clear, conjunctiva normal  NECK:  Supple, symmetrical, trachea midline, no adenopathy, and no tenderness, skin normal  BACK:  Very tender to palpation around incision site. Wound dehiscence at multiple points of incision with pustular drainage. LUNGS:  No increased work of breathing, good air exchange, clear to auscultation bilaterally, no crackles or wheezing  CARDIOVASCULAR:  Normal apical impulse, regular rate and rhythm, normal S1 and S2, no S3 or S4, and no murmur noted  ABDOMEN:  No scars, normal bowel sounds, soft, non-distended, non-tender  MUSCULOSKELETAL:  There is no redness, warmth, or swelling of the joints. Full range of motion noted. Motor strength is 5 out of 5 lower extremities bilaterally except 4+/5 right gastrocnemius. Tone is normal.  NEUROLOGIC:  Awake, alert, oriented to name, place and time. Cranial nerves II-XII are grossly intact. Sensory decreased in RLE L5 distribution.      DATA:    CBC:   Lab Results   Component Value Date    WBC 14.2 04/19/2022    RBC 4.59 04/19/2022    HGB 11.3 04/19/2022    HCT 37.9 04/19/2022    MCV 82.6 04/19/2022    MCH 24.6 04/19/2022    MCHC 29.8 04/19/2022    RDW 13.8 06/15/2016     04/19/2022    MPV 9.3 04/19/2022     WBC:    Lab Results   Component Value Date    WBC 14.2 04/19/2022     Hemoglobin/Hematocrit:    Lab Results   Component Value Date    HGB 11.3 04/19/2022    HCT 37.9 04/19/2022     CMP:    Lab Results   Component Value Date     04/19/2022    K 4.3 04/19/2022    CL 98 04/19/2022    CO2 26 04/19/2022    BUN 14 04/19/2022    CREATININE 0.7 04/19/2022    LABGLOM 87 04/19/2022    GLUCOSE 143 04/19/2022    PROT 7.7 02/20/2022    LABALBU 3.9 02/20/2022    CALCIUM 9.5 04/19/2022    BILITOT 0.2 02/20/2022    ALKPHOS 131 02/20/2022    AST 16 02/20/2022    ALT 16 02/20/2022         IMPRESSION/RECOMMENDATIONS:    Assessment:   1) Post-op lumbar infection      Plan:  1) Case discussed with Dr. Lazara Greenfield, plan for lumbar I&D today followed by inpatient admit to ortho spine  2) Consult Dr Jevon Anguiano for medical management including IV ABX       ana gasca PA-C

## 2022-04-19 NOTE — BRIEF OP NOTE
Brief Postoperative Note      Patient: Suzanne Aceves  YOB: 1970  MRN: [id number]    Date of Procedure: 4/19/2022    Pre-Op Diagnosis: lumbar soft tissue infection    Post-Op Diagnosis: same       Procedure(s):  LUMBAR WOUND INCISION AND DRAINAGE    Surgeon: Maximiliano Greenfield MD    Assistant:  Physician Assistant: Sera Peralta.  ASUNCION Drew    Anesthesia: General    Estimated Blood Loss (mL): 50 ml    Complications: None    Specimens:   ID Type Source Tests Collected by Time Destination   1 : Lumbar Wound Fluid Body Fluid Back CULTURE, BODY FLUID (Canceled), CULTURE, ANAEROBIC AND AEROBIC Ronna Vo MD 4/19/2022 1525        Implants:  * No implants in log *      Drains: * No LDAs found *    Findings: lumbar soft tissue infection    Electronically signed by Renny Lacy PA-C on 4/19/2022 at 5:20 PM 15-Dec-2018

## 2022-04-19 NOTE — PROGRESS NOTES
Patient sent to [address] [place] [address] via bed with transport. Personal belongings sent with patient.  at bedside.

## 2022-04-19 NOTE — ED TRIAGE NOTES
Pt arrives with c/o surgical pain. Pt had lower back surgery on 3/29. Pt states pain began on Sunday and her wound has been \"seeping\". Pt denies having fever or chills.

## 2022-04-19 NOTE — PROGRESS NOTES
Pt returned to [facility] room 5. Vitals and assessment as charted. 0.9 infusing, @800ml to count from PACU. Pt has reyna lemus and diet ginger ale. Family at the bedside. Pt and family verbalized understanding of discharge criteria and call light use. Call light in reach.

## 2022-04-19 NOTE — ED PROVIDER NOTES
EMERGENCY DEPARTMENT ENCOUNTER     CHIEF COMPLAINT   Chief Complaint   Patient presents with    Post-op Problem     back surgery, pain, seeping        HPI   George Oviedo is a 46 y.o. female who presents with recurrence of her upper low back pain, onset was a few days ago, that has been bothering her a lot. In contrast, after her lumbar revision surgery performed by Dr. Nellie Duran on 3/29, she has felt good until recently. She states she has subjective fevers (99.6-99.8F at home), chills and some drainage from the back, and worried that this might be an infection. The pain in the upper portion of the incision site has a knot in it on the right side, which causes her severe pain at rest. The duration has been constant since the onset. The patient has no associated chest pain or sob. There are no alleviating factors. The context is that the symptoms started spontaneously, without any known precipitants. REVIEW OF SYSTEMS   GI: no nausea, vomiting or diarrhea   Cardiac: No chest pain or syncope   Pulmonary: No difficulty breathing or new cough   General: +subjective chills and fevers   Back: +back pain  : See HPI, no hematuria or dysuria   See HPI for further details. All other review of systems are reviewed and are otherwise negative.      PAST MEDICAL & SURGICAL HISTORY   Past Medical History:   Diagnosis Date    Anxiety     Back problem     Chronic back pain     Chronic sinus infection     Constipation     Fibromyalgia     GERD (gastroesophageal reflux disease)     H/O cold sores     acyclovir prn    Hyperlipidemia     Hypothyroid     thyroiditis    Insomnia     Kidney stones 2000    Malabsorption     really just gastric sleeve    Obesity     Obsessive compulsive disorder     Prolonged emergence from general anesthesia     Restless leg syndrome     Sleep apnea     wears CPAP every night - Dr. Verna Boxer incontinence     Type II or unspecified type diabetes mellitus without mention of complication, not stated as uncontrolled     Vitamin D deficiency       Past Surgical History:   Procedure Laterality Date    CARPAL TUNNEL RELEASE Bilateral 2001     SECTION  2005    FOOT SURGERY      right foot    GASTRIC RESTRICTION SURGERY  2016    gastric sleeve    KIDNEY STONE SURGERY      lithotripsy    KNEE ARTHROSCOPY Left 2018    Dr. Merlene Haas  2014    SHOULDER SURGERY Right 2009    SPINAL FUSION  2011    L4-S1 fusion - [facility]    UPPER GASTROINTESTINAL ENDOSCOPY  2016    VAGINAL PROLAPSE REPAIR      RECTAL PROLAPSE        CURRENT MEDICATIONS        ALLERGIES   Allergies   Allergen Reactions    Actos [Pioglitazone]      Edema      Dilaudid [Hydromorphone Hcl] Itching     Per Sabi RN, patient gets itchy with dilaudid (no hives); had a dose in PACU today with no issues.      Morphine Itching    Other      Outdated food like ketchup and peanut butter cause itching    Percocet [Oxycodone-Acetaminophen]      Makes pt pace        SOCIAL AND FAMILY HISTORY   Social History     Socioeconomic History    Marital status:      Spouse name: None    Number of children: None    Years of education: None    Highest education level: None   Occupational History    None   Tobacco Use    Smoking status: Former Smoker     Packs/day: 0.25     Years: 19.00     Pack years: 4.75     Types: Cigarettes     Start date: 1987     Quit date:      Years since quitting: 3.2    Smokeless tobacco: Former User   Vaping Use    Vaping Use: Former    Start date: 2016    Quit date: 2016   Substance and Sexual Activity    Alcohol use: Not Currently     Alcohol/week: 0.0 standard drinks     Comment: occasionally    Drug use: No    Sexual activity: Yes   Other Topics Concern    None   Social History Narrative    None     Social Determinants of Health     Financial Resource Strain:    Maryjo Me Difficulty of Paying Living Expenses: Not on file   Food Insecurity:     Worried About Running Out of Food in the Last Year: Not on file    Ran Out of Food in the Last Year: Not on file   Transportation Needs:     Lack of Transportation (Medical): Not on file    Lack of Transportation (Non-Medical):  Not on file   Physical Activity:     Days of Exercise per Week: Not on file    Minutes of Exercise per Session: Not on file   Stress:     Feeling of Stress : Not on file   Social Connections:     Frequency of Communication with Friends and Family: Not on file    Frequency of Social Gatherings with Friends and Family: Not on file    Attends Gnosticist Services: Not on file    Active Member of Clubs or Organizations: Not on file    Attends Club or Organization Meetings: Not on file    Marital Status: Not on file   Intimate Partner Violence:     Fear of Current or Ex-Partner: Not on file    Emotionally Abused: Not on file    Physically Abused: Not on file    Sexually Abused: Not on file   Housing Stability:     Unable to Pay for Housing in the Last Year: Not on file    Number of [place] in the Last Year: Not on file    Unstable Housing in the Last Year: Not on file      Family History   Problem Relation Age of Onset    Diabetes Mother     High Blood Pressure Mother     Heart Disease Mother     Obesity Mother     COPD Mother     Diabetes Father     Heart Attack Father     Diabetes Sister     Obesity Sister     Other Sister         [place]O    High Blood Pressure Sister     High Blood Pressure Brother     Obesity Brother     Heart Attack Brother     Breast Cancer Paternal Grandmother 72    Diabetes Brother     Stroke Neg Hx     Cancer Neg Hx         PHYSICAL EXAM   VITAL SIGNS: BP (!) 111/57   Pulse 97   Temp 99.6 °F (37.6 °C) (Oral)   Resp 18   Ht 5' 7\" (1.702 m)   Wt 271 lb (122.9 kg)   SpO2 95%   BMI 42.44 kg/m²    Constitutional: Well developed, well nourished, mild distress  Eyes: Sclera nonicteric, conjunctiva moist   HENT: Atraumatic, nose normal   Neck: Supple, no JVD   Respiratory: No retractions, no accessory muscle use, normal breath sounds   Cardiovascular: Normal rate, normal rhythm, no murmurs   GI: Soft, no abdominal tenderness, no guarding, bowel sounds present, no audible bruits or palpable pulsatile masses   Musculoskeletal: No edema, no deformity, 10 cm long linear vertical incision covering mid-lumbar spine with exquisite tenderness in upper lumbar region with palpation. Very mild wound dehiscence noted in upper portions of the incision site; no active bloody or purulent drainage noted. Integument: No rash, dry skin   Neurologic: Alert & oriented, normal speech   Psychiatric: Cooperative, pleasant affect     RADIOLOGY/PROCEDURES   No orders to display        LABS   Labs Reviewed   CBC WITH AUTO DIFFERENTIAL - Abnormal; Notable for the following components:       Result Value    WBC 14.2 (*)     Hemoglobin 11.3 (*)     MCH 24.6 (*)     MCHC 29.8 (*)     RDW-CV 15.3 (*)     RDW-SD 45.6 (*)     Platelets 667 (*)     MPV 9.3 (*)     Segs Absolute 9.0 (*)     All other components within normal limits   BASIC METABOLIC PANEL W/ REFLEX TO MG FOR LOW K - Abnormal; Notable for the following components:    Glucose 143 (*)     All other components within normal limits   GLOMERULAR FILTRATION RATE, ESTIMATED - Abnormal; Notable for the following components:    Est, Glom Filt Rate 87 (*)     All other components within normal limits   OSMOLALITY - Abnormal; Notable for the following components:    Osmolality Calc 274.9 (*)     All other components within normal limits   CULTURE, BODY FLUID   CULTURE, ANAEROBIC AND AEROBIC   ANION GAP        ED COURSE & MEDICAL DECISION MAKING   Pertinent Labs & Imaging studies reviewed and interpreted.  (See chart for details)       See EMR for medications prescribed   Differential diagnosis: back sprain, vertebral fracture, wedge fracture, non-specific back pain, UTI     FINAL IMPRESSION   1. Intractable low back pain    2. Post-op pain    3. Leukocytosis, unspecified type         PLAN   MDM - pt required two doses of narcotic pain medications to help manage her pain, coupled with her being sent here by Dr. Jenny Lawler for evaluation, is indeed concerning for a deep infection from a seroma(?). I performed a bedside US and found some small pockets of anechoic fluid collection along the entire incision site (1-2 cm diameter at most). The skin surface did not appear infected. Pt's WBC count was elevated. Dr. Jenny Lawler was consulted and requested MRI, but given the fact that pt required general anesthesia last time she got an MRI, I do not think the ED could do this. Therefore, I got go-ahead from Dr. Jenny Lawler to have pt be admitted to hospitalist service for the MRI to take place followed by appropriate ortho surgery consultation. Pt agreed to plan for admission, admitted to floor in stable condition.      Electronically signed by: Gregory Gilliland MD, 4/19/2022 3:40 PM   (This note was completed with a voice recognition program)         To Duong MD  04/19/22 2476

## 2022-04-20 VITALS
SYSTOLIC BLOOD PRESSURE: 127 MMHG | BODY MASS INDEX: 42.53 KG/M2 | OXYGEN SATURATION: 95 % | TEMPERATURE: 98.1 F | DIASTOLIC BLOOD PRESSURE: 60 MMHG | HEART RATE: 67 BPM | RESPIRATION RATE: 16 BRPM | HEIGHT: 67 IN | WEIGHT: 271 LBS

## 2022-04-20 PROCEDURE — 6370000000 HC RX 637 (ALT 250 FOR IP): Performed by: PHYSICIAN ASSISTANT

## 2022-04-20 PROCEDURE — 2580000003 HC RX 258

## 2022-04-20 PROCEDURE — 6360000002 HC RX W HCPCS

## 2022-04-20 PROCEDURE — 6370000000 HC RX 637 (ALT 250 FOR IP)

## 2022-04-20 PROCEDURE — 6370000000 HC RX 637 (ALT 250 FOR IP): Performed by: INTERNAL MEDICINE

## 2022-04-20 RX ORDER — CYCLOBENZAPRINE HCL 10 MG
10 TABLET ORAL 3 TIMES DAILY PRN
Qty: 30 TABLET | Refills: 0 | Status: SHIPPED | OUTPATIENT
Start: 2022-04-20 | End: 2022-04-30

## 2022-04-20 RX ORDER — GLIPIZIDE 5 MG/1
5 TABLET ORAL
Status: DISCONTINUED | OUTPATIENT
Start: 2022-04-21 | End: 2022-04-20 | Stop reason: HOSPADM

## 2022-04-20 RX ORDER — HYDROCODONE BITARTRATE AND ACETAMINOPHEN 5; 325 MG/1; MG/1
1 TABLET ORAL EVERY 6 HOURS PRN
Qty: 28 TABLET | Refills: 0 | Status: SHIPPED | OUTPATIENT
Start: 2022-04-20 | End: 2022-04-27

## 2022-04-20 RX ORDER — GLIPIZIDE 5 MG/1
5 TABLET ORAL ONCE
Status: COMPLETED | OUTPATIENT
Start: 2022-04-20 | End: 2022-04-20

## 2022-04-20 RX ADMIN — LEVOTHYROXINE SODIUM 150 MCG: 0.15 TABLET ORAL at 06:18

## 2022-04-20 RX ADMIN — FLUTICASONE PROPIONATE 2 SPRAY: 50 SPRAY, METERED NASAL at 07:45

## 2022-04-20 RX ADMIN — PANTOPRAZOLE SODIUM 40 MG: 40 TABLET, DELAYED RELEASE ORAL at 07:46

## 2022-04-20 RX ADMIN — HYDROCODONE BITARTRATE AND ACETAMINOPHEN 2 TABLET: 5; 325 TABLET ORAL at 05:10

## 2022-04-20 RX ADMIN — HYDROCODONE BITARTRATE AND ACETAMINOPHEN 2 TABLET: 5; 325 TABLET ORAL at 11:10

## 2022-04-20 RX ADMIN — CEFAZOLIN 3000 MG: 10 INJECTION, POWDER, FOR SOLUTION INTRAVENOUS at 06:19

## 2022-04-20 RX ADMIN — CYCLOBENZAPRINE 10 MG: 10 TABLET, FILM COATED ORAL at 07:44

## 2022-04-20 RX ADMIN — SODIUM CHLORIDE: 9 INJECTION, SOLUTION INTRAVENOUS at 01:39

## 2022-04-20 RX ADMIN — GLIPIZIDE 5 MG: 5 TABLET ORAL at 10:18

## 2022-04-20 ASSESSMENT — PAIN DESCRIPTION - FREQUENCY
FREQUENCY: INTERMITTENT
FREQUENCY: CONTINUOUS

## 2022-04-20 ASSESSMENT — PAIN DESCRIPTION - ONSET
ONSET: ON-GOING

## 2022-04-20 ASSESSMENT — PAIN DESCRIPTION - DESCRIPTORS
DESCRIPTORS: SORE
DESCRIPTORS: ACHING;SORE
DESCRIPTORS: ACHING;SORE
DESCRIPTORS: SORE

## 2022-04-20 ASSESSMENT — PAIN DESCRIPTION - PAIN TYPE
TYPE: SURGICAL PAIN

## 2022-04-20 ASSESSMENT — PAIN DESCRIPTION - LOCATION
LOCATION: BACK

## 2022-04-20 ASSESSMENT — PAIN SCALES - GENERAL
PAINLEVEL_OUTOF10: 3
PAINLEVEL_OUTOF10: 3
PAINLEVEL_OUTOF10: 7
PAINLEVEL_OUTOF10: 4
PAINLEVEL_OUTOF10: 4
PAINLEVEL_OUTOF10: 7

## 2022-04-20 ASSESSMENT — PAIN DESCRIPTION - ORIENTATION
ORIENTATION: MID
ORIENTATION: MID
ORIENTATION: LOWER;MID
ORIENTATION: MID
ORIENTATION: MID

## 2022-04-20 ASSESSMENT — PAIN - FUNCTIONAL ASSESSMENT
PAIN_FUNCTIONAL_ASSESSMENT: PREVENTS OR INTERFERES SOME ACTIVE ACTIVITIES AND ADLS

## 2022-04-20 NOTE — CARE COORDINATION
4/20/22, 7:31 AM EDT  DISCHARGE PLANNING EVALUATION:    Tiffany Coronel       Admitted: 4/19/2022/ [address] day: 1   Location: [facility]27/027A Reason for admit: Post-op pain [G89.18]  Intractable low back pain [M54.59]  Leukocytosis, unspecified type [D72.829]  Soft tissue infection of lumbar spine [M79.89, B99.9]   PMH:  has a past medical history of Anxiety, Back problem, Chronic back pain, Chronic sinus infection, Constipation, Fibromyalgia, GERD (gastroesophageal reflux disease), H/O cold sores, Hyperlipidemia, Hypothyroid, Insomnia, Kidney stones, Malabsorption, Obesity, Obsessive compulsive disorder, Prolonged emergence from general anesthesia, Restless leg syndrome, Sleep apnea, Stress incontinence, Type II or unspecified type diabetes mellitus without mention of complication, not stated as uncontrolled, and Vitamin D deficiency. Procedure:   4/19 LUMBAR WOUND INCISION AND DRAINAGE     Barriers to Discharge:  From PACU, WBC 14.2, pain control, neuro checks, IV fluids, Lipitor, Protonix. PCP: Woody Musa MD  Readmission Risk Score: 8.1 ( )%    Patient Goals/Plan/Treatment Preferences: Met with Jossie Scott. She currently lives at home with her  and children. She has equipment, walker, cane. Plan is to return home at discharge. She denies need for DME and declines HH. Transportation/Food Security/Housekeeping Addressed:  No issues identified. 4/20/22, 11:06 AM EDT    Patient goals/plan/ treatment preferences discussed by  and . Patient goals/plan/ treatment preferences reviewed with patient/ family. Patient/ family verbalize understanding of discharge plan and are in agreement with goal/plan/treatment preferences. Understanding was demonstrated using the teach back method.   AVS provided by RN at time of discharge, which includes all necessary medical information pertaining to the patients current course of illness, treatment, post-discharge goals of care, and treatment preferences. Pt to be discharged to home with spouse. Denies needs or services.

## 2022-04-20 NOTE — PLAN OF CARE
Problem: Falls - Risk of:  Goal: Will remain free from falls  Description: Will remain free from falls  Outcome: Ongoing  Note: Pt using call light appropriately to call for assistance with ambulation to the bathroom and to chair. Pt is also compliant with use of non-skid slippers. Pt reports understanding of fall prevention when discussed. Goal: Absence of physical injury  Description: Absence of physical injury  Outcome: Ongoing  Note: Pt using call light appropriately to call for assistance with ambulation to the bathroom and to chair. Pt is also compliant with use of non-skid slippers. Pt reports understanding of fall prevention when discussed. Problem: Discharge Planning:  Goal: Discharged to appropriate level of care  Description: Discharged to appropriate level of care  Outcome: Ongoing  Note: Pt likely to be discharged today. Plans to go home with family   Care plan reviewed with patient. Patient verbalize understanding of the plan of care and contribute to goal setting.

## 2022-04-20 NOTE — PROGRESS NOTES
Department of Orthopedic Surgery  Spine Service  Attending Progress Note        Subjective:  Doing well, no new issues    Vitals  VITALS:  /60   Pulse 68   Temp 98.4 °F (36.9 °C) (Oral)   Resp 18   Ht 5' 7\" (1.702 m)   Wt 271 lb (122.9 kg)   SpO2 93%   BMI 42.44 kg/m²   24HR INTAKE/OUTPUT:    Intake/Output Summary (Last 24 hours) at 4/20/2022 7852  Last data filed at 4/20/2022 0112  Gross per 24 hour   Intake 1100 ml   Output --   Net 1100 ml     URINARY CATHETER OUTPUT (Aranda):     DRAIN/TUBE OUTPUT:         PHYSICAL EXAM:    Orientation:  alert and oriented to person, place and time    Incision:  dressing in place, clean, dry, intact      Lower Extremity Motor :  quadriceps, extensor hallucis longus, dorsiflexion, plantarflexion 5/5 bilaterally  Lower Extremity Sensory:  Intact L1-S1    Flatus:  positive    ABNORMAL EXAM FINDINGS:  none    LABS:    HgB:    Lab Results   Component Value Date    HGB 11.3 04/19/2022       ASSESSMENT AND PLAN:    Post operative day 1 status post Lumbar I&D and wound closure    1:  Monitor labs   2:  Activity Level:  As tolerated  3:  Pain Control:  good  4:  Discharge Planning:   Today, follow up in 2 weeks for suture removal    [facility]

## 2022-04-20 NOTE — PLAN OF CARE
Problem: Pain:  Description: Pain management should include both nonpharmacologic and pharmacologic interventions. Goal: Pain level will decrease  Description: Pain level will decrease  Outcome: Completed     Problem: Falls - Risk of:  Goal: Will remain free from falls  Description: Will remain free from falls  4/20/2022 0939 by Salud Nicole RN  Outcome: Completed  4/20/2022 0329 by Domitila Bhatia RN  Outcome: Ongoing  Note: Pt using call light appropriately to call for assistance with ambulation to the bathroom and to chair. Pt is also compliant with use of non-skid slippers. Pt reports understanding of fall prevention when discussed. Goal: Absence of physical injury  Description: Absence of physical injury  4/20/2022 0329 by Domitila Bhatia RN  Outcome: Ongoing  Note: Pt using call light appropriately to call for assistance with ambulation to the bathroom and to chair. Pt is also compliant with use of non-skid slippers. Pt reports understanding of fall prevention when discussed. Problem: Discharge Planning:  Goal: Discharged to appropriate level of care  Description: Discharged to appropriate level of care  4/20/2022 0939 by Salud Nicole RN  Outcome: Completed  4/20/2022 0329 by Domitila Bhatia RN  Outcome: Ongoing  Note: Pt likely to be discharged today.  Plans to go home with family     Problem: Discharge Planning  Goal: Discharge to home or other facility with appropriate resources  Outcome: Completed     Problem: Chronic Conditions and Co-morbidities  Goal: Patient's chronic conditions and co-morbidity symptoms are monitored and maintained or improved  Outcome: Completed

## 2022-04-20 NOTE — PROGRESS NOTES
Pt admitted to  [facility] from [facility]. Post-OP: LUMBAR WOUND INCISION AND DRAINAGE    IV normal saline infusing into the forearm left, condition patent and no redness at a rate of 125 mls/ hour with about 600 mls in the bag still. IV site free of s/s of infection or infiltration. Vital signs obtained. Assessment and data collection initiated. Two nurse skin assessment performed by Syeda Stevenson RN and Tyron MAYBERRY. Oriented to room. Explained patients right to have family, representative or physician notified of their admission. Patient has Declined for physician to be notified. Patient has Declined for family/representative to be notified. The patient is interested in [facility]. Vanias meds to beds program?:  No    Policies and procedures for  explained. All questions answered with no further questions at this time. Fall prevention and safety discussed with patient. Bed alarm on. Call light in reach.

## 2022-04-21 NOTE — ANESTHESIA POSTPROCEDURE EVALUATION
Department of Anesthesiology  Postprocedure Note    Patient: Silva Martinez  MRN: [id number]  YOB: 1970  Date of evaluation: 4/21/2022  Time:  7:23 AM     Procedure Summary     Date: 04/19/22 Room / Location: [address]    Anesthesia Start: 0508 Anesthesia Stop: 1614    Procedure: LUMBAR WOUND INCISION AND DRAINAGE (N/A ) Diagnosis: (POST OP PROBLEM, INFECTED INCISION)    Surgeons: Wally Aaron MD Responsible Provider: Rosio Roque MD    Anesthesia Type: general ASA Status: 3          Anesthesia Type: general    Brianda Phase I: Brianda Score: 9    Brianda Phase II: Brianda Score: 9    Last vitals: Reviewed and per EMR flowsheets.        Anesthesia Post Evaluation    Complications: no

## 2022-04-24 LAB
AEROBIC CULTURE: ABNORMAL
ANAEROBIC CULTURE: ABNORMAL
GRAM STAIN RESULT: ABNORMAL
ORGANISM: ABNORMAL

## 2022-04-26 NOTE — OP NOTE
Operative Note      PATIENT NAME: Jaja Park OF BIRTH: 1970  MRN: [id number]                      ACCOUNT NO: [de-identified]                               ADMISSION DATE: 04/19/2022    PROVIDER:  Elvira Jules M.D.    DATE OF PROCEDURE: 04/19/2022     PREOPERATIVE DIAGNOSES:  1.  3 months status post L3-S1 hardware removal, L2-S1 decompression and L2-3 fusion  2.  3 weeks status post lumbar I&D with L3-S1 revision decompression with wound dehiscence and suspected infection  3. Obesity with BMI 42.44    POSTOPERATIVE DIAGNOSES:  1.  3 months status post L3-S1 hardware removal, L2-S1 decompression and L2-3 fusion  2.  3 weeks status post lumbar I&D with L3-S1 revision decompression with wound dehiscence and suspected infection  3. Obesity with BMI 42.44     OPERATION PERFORMED:   1. Lumbar I&D      SURGEON:  Petty Jules M.D.     ASSISTANT:  ASUNCION Browne, [facility], assisted throughout the procedure with positioning, draping, retraction, wound closure, and dressing application     ANESTHESIA:  General.     INDICATIONS:  This is a 46 y.o. female who presented to the ED today with complaints of drainage from her incision with a low grade fever, elevated WBC count and wound dehiscence. She was taken to the OR same day for exploration of lumbar wound and I&D. Patient, therefore, understood the indication for the surgery as well as its risks, benefits, and alternatives. These risks include, but are not limited to paralysis, infection, hematoma, dural tear, nerve root injury, DVT/PE, stroke, MI, death etc.  All questions were answered and informed consent was obtained. DESCRIPTION OF PROCEDURE:  The patient was taken to the operating room by Anesthesiology Service and had satisfactory general anesthesia. A first-generation cephalosporin was given within one hour of surgical incision, 2 gm of cefazolin were given IV (antibiotics were held until cultures were taken).   Venous thromboembolic prophylaxis was performed with sequential devices. The patient was then positioned prone on a standard OSI frame with the abdomen hanging free and all bony prominences well padded. The low back was then prepped and draped in its entirety in the usual sterile fashion. Before incision, a formal timeout was taken per protocol. We then opened up the incision proximal to distal.  We gained access into the epidural space. There was a small fluid collection which was evacuated. Fluid was more consistent with a seroma - fluid was collected and sent for culture. We then explored the lumbar wound and there was concern for suspected infection. Excisional debridement was then performed. Sharp dissection with a scalpel was used to remove the necrotic tissue from muscle to fascia and down to and excluding bone within the wound margins. The wound was then irrigated copiously with large amount of normal saline. Satisfied with this, we then achieved hemostasis. We then copiously irrigated the wound. We then inserted a Hemovac drain through a separate stab incision. The wound was then closed in layer with interrupted 1 and 2-0 Vicryl sutures and dusted with vancomycin powder. A 3-0 Ethilon baseball stitch was used for skin. A dry sterile dressing was applied. The patient was then returned to the hospital bed, extubated, and taken to recovery room in stable condition. Specimens:   ID Type Source Tests Collected by Time Destination   1 : Lumbar Wound Fluid Body Fluid Back CULTURE, BODY FLUID (Canceled), CULTURE, ANAEROBIC AND AEROBIC Nadira Smart MD 4/19/2022 1525        Implants:  * No implants in log *      COMPLICATIONS:  None. SPECIMENS:  Lumbar wound fluid     ESTIMATED BLOOD LOSS:  50 mL. POSTOPERATIVE CARE:  The patient will be recovered in PACU and then a regular nursing floor.   Once the drainage is low and pain is under control and cultures results are back, patient will be discharged home per clinical indication. Patient will follow up in the office in two weeks for a suture removal.    MODIFIER 22:  Due to the patient's BMI greater than 30, modifier 22 will be applied due to the patient's case taking 50% longer due to poor visualization and orientation.     Electronically signed by Lissy Kinsey MD on 4/26/2022 at 10:45 AM

## 2022-05-26 NOTE — DISCHARGE SUMMARY
Discharge Summary        Date of Admission: 4/19/22  Date of Discharge: 4/20/22       DATE OF PROCEDURE: 04/19/2022     PREOPERATIVE DIAGNOSES:  1.  3 months status post L3-S1 hardware removal, L2-S1 decompression and L2-3 fusion  2.  3 weeks status post lumbar I&D with L3-S1 revision decompression with wound dehiscence and suspected infection  3. Obesity with BMI 42.44     POSTOPERATIVE DIAGNOSES:  1.  3 months status post L3-S1 hardware removal, L2-S1 decompression and L2-3 fusion  2.  3 weeks status post lumbar I&D with L3-S1 revision decompression with wound dehiscence and suspected infection  3. Obesity with BMI 42.44     OPERATION PERFORMED:   1.  Lumbar I&D      SURGEON:  Petty Will M.D.     ASSISTANT: ASUNCION Hodges, NANDA, assisted throughout the procedure with positioning, draping, retraction, wound closure, and dressing application     ANESTHESIA:  General.     INDICATIONS:  This is a 46 y.o. female who presented to the ED today with complaints of drainage from her incision with a low grade fever, elevated WBC count and wound dehiscence. She was taken to the OR same day for exploration of lumbar wound and I&D. Patient, therefore, understood the indication for the surgery as well as its risks, benefits, and alternatives.  These risks include, but are not limited to paralysis, infection, hematoma, dural tear, nerve root injury, DVT/PE, stroke, MI, death etc.  All questions were answered and informed consent was obtained. HOSPITAL COURSE:   The patient was admitted on the above date had the above procedure performed. Patient had no complications during surgery. The patient was then transferred to the PACU and to a regular nursing floor for postop care. The patient's pain was initially controlled with IV medications, but we were able to transition to oral pain medications soon after arrival to the floor. Their pain remained under good control through their hospital stay. Faraz Das Patient had improvement of their radicular symptoms. The patient had a bowel movement before discharge. The patient was found to be an ideal candidate for discharge to home. The patient was then safely discharged to home on the above date. PHYSICAL EXAMINATION ON DISCHARGE:   The patient was afebrile, stable. Dressing was clean, dry and intact. 5/5 strength in bilateral lower extremities. Neurologically intact. DISCHARGE INSTRUCTIONS:   Patient can resume regular diet. Resume home medications except for NSAIDs or blood thinners. Resume asa POD#2 and all other blood thinners POD#5. Okay to resume NSAIDs 6 months after surgery. Take previously prescribed narcotic pain medications as directed. Change the dressing daily until the incision is clean and dry and then leave open to air. Okay to take a shower without submerging the incision. Wear the brace at all times when up and ambulating. Limit any heavy lifting, bending or twisting until first postoperative visit. Patient to follow up with Dr. Gregorio Rosas 2 weeks for suture removal.    CONDITION AT DISCHARGE:  Stable    DISPOSITION:   Home    [facility]ara Drew PA-C

## 2022-06-30 ENCOUNTER — TELEMEDICINE (OUTPATIENT)
Dept: PSYCHIATRY | Age: 52
End: 2022-06-30
Payer: MEDICARE

## 2022-06-30 DIAGNOSIS — F32.A DEPRESSION, UNSPECIFIED DEPRESSION TYPE: ICD-10-CM

## 2022-06-30 DIAGNOSIS — G89.4 CHRONIC PAIN SYNDROME: ICD-10-CM

## 2022-06-30 DIAGNOSIS — M79.2 NERVE PAIN: ICD-10-CM

## 2022-06-30 DIAGNOSIS — F42.4 SKIN-PICKING DISORDER: Primary | ICD-10-CM

## 2022-06-30 DIAGNOSIS — F41.9 ANXIETY: ICD-10-CM

## 2022-06-30 PROCEDURE — 99214 OFFICE O/P EST MOD 30 MIN: CPT | Performed by: PSYCHIATRY & NEUROLOGY

## 2022-06-30 RX ORDER — PREGABALIN 50 MG/1
50 CAPSULE ORAL EVERY MORNING
Qty: 90 CAPSULE | Refills: 0 | Status: SHIPPED | OUTPATIENT
Start: 2022-06-30 | End: 2022-07-18 | Stop reason: SDUPTHER

## 2022-06-30 RX ORDER — VENLAFAXINE HYDROCHLORIDE 150 MG/1
150 CAPSULE, EXTENDED RELEASE ORAL DAILY
Qty: 90 CAPSULE | Refills: 0 | Status: SHIPPED | OUTPATIENT
Start: 2022-06-30 | End: 2022-09-06 | Stop reason: SDUPTHER

## 2022-06-30 RX ORDER — TRAZODONE HYDROCHLORIDE 100 MG/1
200 TABLET ORAL NIGHTLY
Qty: 180 TABLET | Refills: 0 | Status: SHIPPED | OUTPATIENT
Start: 2022-06-30 | End: 2022-09-06 | Stop reason: SDUPTHER

## 2022-06-30 RX ORDER — PREGABALIN 200 MG/1
200 CAPSULE ORAL NIGHTLY
Qty: 90 CAPSULE | Refills: 0 | Status: SHIPPED | OUTPATIENT
Start: 2022-06-30 | End: 2022-09-06 | Stop reason: SDUPTHER

## 2022-06-30 NOTE — PROGRESS NOTES
[address] [phone]    Progress Note    Patient:  Wendy Gomez  YOB: 1970  PCP:  Nader Marino MD  Visit Date:  6/30/2022    TELEHEALTH EVALUATION -- Audio/Visual (During RSGXC-81 public health emergency)    Patient location: home  Physician location: [place], [facility]  This virtual visit was conducted via interactive, real-time video. Chief Complaint   Patient presents with    Follow-up    Medication Check    Anxiety    Depression       SUBJECTIVE:      Wendy Gomez, a 46 y. o. female, presents for a follow up visit. Patient reports she is doing well. Patient is compliant with medication regimen. She presents alone. Doing ok. Mowing grass today. Notes still recovering from back surgery complicated by infection in Jan. Limited mobility. Did revision surgery 3/29 and got another infection. Did I/D overnight 4/19. Wound finally healed. RTW on 5/9 and did well with that. 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 Hard to sit all day. In PT. Improving slowly. RLE numb from waist to foot. Not using cane. Balance issues. Harder to drive. Said that could take a year to heal. Unsure if it will. Mood \"not too too bad\". Hasn't been picking at her skin. No longer using melatonin. Was keeping her awake because left her more anxious at night. On Tramadol and Flexeril. Had injections for bursitis. Nerve pain on her foot. Hurts to wear socks. Developed after the surgery. Told the surgeon. Rug burn feeling top of R arm. Starting to feel some anxiety before bedtime again, jittery. Agitated. Discussed tx options and we agree to try Lyrica in morning for nerve pain and also to see if helps anxiety. She's aware it's off label for anxiety.        Med Trials:trazodone, Prozac, hydroxyzine, Lexapro, Lyrica, NAC, Cymbalta, Topamax, Elavil, Xanax, Effexor (benefit), Vistaril (RLS)    OBJECTIVE:  Vitals: There were no vitals taken for this visit. MENTAL STATUS EXAM:    GENERAL  Build: Overweight    Hygiene:  Appropriate   SENSORIUM Orientation: Place, Person, Time, & Situation     Consciousness: Alert    ATTENTION   Focused    RELATEDNESS  Cooperative    EYE CONTACT   Good    PSYCHOMOTOR  Normal    SPEECH Volume: Normal     Rate: Normal rate and tone    Amplitude: Within normal limits, talkative   MOOD  \"not too bad\"    AFFECT Range: Full, mood congruent   THOUGHT Process:  Goal-Directed     Content: no evidence of psychosis    COGNITION Insight: Good    Judgement:  Intact    MEMORY  Intact    INTELLIGENCE  Average     Mobility/Gait: Independently     Controlled SubstancesMonitoring: Periodic Controlled Substance Monitoring: Possible medication side effects, risk of tolerance/dependence & alternative treatments discussed. ,No signs of potential drug abuse or diversion identified. Chano Ramirez MD)       ASSESSMENT: Will start Lyrica at lower dose in morning for nerve pain. Possible anxiety benefit, off label. Mood is good and stable. Effexor improved mood, skin-picking, energy, anhedonia, and motivation. Lyrica improved sleep and pain. Diagnosis Orders   1. Skin-picking disorder     2. Anxiety  pregabalin (LYRICA) 50 MG capsule    pregabalin (LYRICA) 200 MG capsule   3. Depression, unspecified depression type     4. Chronic pain syndrome     5.  Nerve pain  pregabalin (LYRICA) 50 MG capsule    pregabalin (LYRICA) 200 MG capsule   R/o: OCD, STEVEN, PTSD, panic d/o  H/o alcohol abuse, last drank 2 years ago  Medical Hx:    PLAN:     · Medications:   · Effexor  mg QHS - for anxiety, picking/compulsions, and depression  · Lyrica 200 mg HS  - for pain, may have anxiety and sleep benefit  · Start Lyrica 50 mg QAM - for pain  · Trazodone 200 mg HS  - for insomnia and depression  · Melatonin 10 mg HS  · Therapy: none  · Labs/Tests/Imaging: none · Records Reviewed: [facility]  · Patient advised to call if patient has any difficulties with treatment  Return in about 8 weeks (around 8/25/2022) for med check, follow up. Vicky Segovia, was evaluated through a synchronous (real-time) audio-video encounter. The patient (or guardian if applicable) is aware that this is a billable service, which includes applicable copays. This virtual visit was conducted with patient's (and/or legal guardian's) consent. The visit was conducted pursuant to the emergency declaration under the [address] [place] [address], [address] [place] [address] authority and the [employer] General Act. Patient identification was verified, and a caregiver was present when appropriate. The patient was located in a state where the provider was licensed to provide care.       Total time spent for this encounter: not billed by time    Electronically signed by Seymour Neri MD on 7/7/2022 at 9:19 AM

## 2022-07-15 ENCOUNTER — TELEPHONE (OUTPATIENT)
Dept: PSYCHIATRY | Age: 52
End: 2022-07-15

## 2022-07-15 DIAGNOSIS — F41.9 ANXIETY: ICD-10-CM

## 2022-07-15 DIAGNOSIS — M79.2 NERVE PAIN: ICD-10-CM

## 2022-07-15 NOTE — TELEPHONE ENCOUNTER
Monet Kelly called stating Dr. Gerald Saucedo mentioned to her previously that she could call in to the office if she felt that the morning dose of Lyrica needed to be increased. She is currently taking 50 mg in the morning. 200 mg at night. Monet Kelly would like to increase Lyrica to 100 mg in the morning. Please advise. She is scheduled to follow up 09/06/2022.

## 2022-07-18 RX ORDER — PREGABALIN 100 MG/1
100 CAPSULE ORAL EVERY MORNING
Qty: 90 CAPSULE | Refills: 0 | Status: SHIPPED | OUTPATIENT
Start: 2022-07-18 | End: 2022-09-06 | Stop reason: SDUPTHER

## 2022-07-18 NOTE — TELEPHONE ENCOUNTER
Rx sent for Lyrica 100 mg in morning.    Electronically signed by Bruce Sánchez MD on 7/18/2022 at 7:49 AM

## 2022-07-27 RX ORDER — TRAZODONE HYDROCHLORIDE 100 MG/1
TABLET ORAL
Qty: 180 TABLET | Refills: 0 | OUTPATIENT
Start: 2022-07-27

## 2022-09-06 ENCOUNTER — TELEMEDICINE (OUTPATIENT)
Dept: PSYCHIATRY | Age: 52
End: 2022-09-06
Payer: COMMERCIAL

## 2022-09-06 DIAGNOSIS — F32.A DEPRESSION, UNSPECIFIED DEPRESSION TYPE: ICD-10-CM

## 2022-09-06 DIAGNOSIS — F41.9 ANXIETY: ICD-10-CM

## 2022-09-06 DIAGNOSIS — G89.4 CHRONIC PAIN SYNDROME: ICD-10-CM

## 2022-09-06 DIAGNOSIS — M79.2 NERVE PAIN: ICD-10-CM

## 2022-09-06 DIAGNOSIS — F42.4 SKIN-PICKING DISORDER: Primary | ICD-10-CM

## 2022-09-06 PROCEDURE — 99214 OFFICE O/P EST MOD 30 MIN: CPT | Performed by: PSYCHIATRY & NEUROLOGY

## 2022-09-06 RX ORDER — TRAZODONE HYDROCHLORIDE 100 MG/1
200 TABLET ORAL NIGHTLY
Qty: 180 TABLET | Refills: 0 | Status: SHIPPED | OUTPATIENT
Start: 2022-09-06 | End: 2022-11-03 | Stop reason: SDUPTHER

## 2022-09-06 RX ORDER — PREGABALIN 200 MG/1
200 CAPSULE ORAL NIGHTLY
Qty: 90 CAPSULE | Refills: 0 | Status: SHIPPED | OUTPATIENT
Start: 2022-09-06 | End: 2022-11-03 | Stop reason: SDUPTHER

## 2022-09-06 RX ORDER — PREGABALIN 100 MG/1
100 CAPSULE ORAL EVERY MORNING
Qty: 90 CAPSULE | Refills: 0 | Status: SHIPPED | OUTPATIENT
Start: 2022-09-06 | End: 2022-11-03 | Stop reason: SDUPTHER

## 2022-09-06 RX ORDER — VENLAFAXINE HYDROCHLORIDE 75 MG/1
75 CAPSULE, EXTENDED RELEASE ORAL DAILY
Qty: 90 CAPSULE | Refills: 0 | Status: SHIPPED | OUTPATIENT
Start: 2022-09-06 | End: 2022-11-03 | Stop reason: SDUPTHER

## 2022-09-06 RX ORDER — VENLAFAXINE HYDROCHLORIDE 150 MG/1
150 CAPSULE, EXTENDED RELEASE ORAL DAILY
Qty: 90 CAPSULE | Refills: 0 | Status: SHIPPED | OUTPATIENT
Start: 2022-09-06 | End: 2022-11-03 | Stop reason: SDUPTHER

## 2022-09-06 NOTE — PROGRESS NOTES
[address] [facility] PSYCHIATRY  [facility] [phone]    Progress Note    Patient:  Alicia Reilly  YOB: 1970  PCP:  Cesia Barnett MD  Visit Date:  9/6/2022    TELEHEALTH EVALUATION -- Audio/Visual (During YEBMG-26 public health emergency)    Patient location: home  Physician location: [place], [facility]  This virtual visit was conducted via interactive, real-time video. Chief Complaint   Patient presents with    Follow-up    Medication Check    Anxiety    Depression    Pain       SUBJECTIVE:      Alicia Reilly, a 46 y. o. female, presents for a follow up visit. Patient reports she is  worse . Patient is compliant with medication regimen. She presents alone. Doing \"I don't know. \" Noting she's started picking again back in July. Went back to work and initially was fine. 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 Notes that worsened anxiety as was still having back pain and in PT. Pain ongoing. Fell and sprained her ankle now wearing a boot x 2 weeks. Had to push back work inspections. Worried about her inability to do them. Back issues and now her ankle. \"Lost of anxiety and stress. \" Numbness of R leg. Walking off balance with the boot also hurting her back. Can't catch a break. Had a skin biopsy and waiting on those results. Had to cancel her appts this month. Her boss is \"wonderful\" and has been understanding. Spots on her leg which started as bug bites. Have now scabbed since picking them. Suggested wearing pants or using bandages. Discussed option for therapy. She's considering Cleve Lee in [facility] and will check with insurance. Notes her recovery has taken longer than expected. Frustrating. Lyrica increase in morning \"helped tremendously\". Not sedating. Discussed tx options and we agree to try Effexor increase for anxiety. Med Trials:trazodone, Prozac, hydroxyzine, Lexapro, Lyrica, NAC, Cymbalta, Topamax, Elavil, Xanax, Effexor (benefit), Vistaril (RLS)    OBJECTIVE:  Vitals: There were no vitals taken for this visit. MENTAL STATUS EXAM:    GENERAL  Build: Overweight    Hygiene:  Appropriate in casual dress   SENSORIUM Orientation: Place, Person, Time, & Situation     Consciousness: Alert    ATTENTION   Focused    RELATEDNESS  Cooperative    EYE CONTACT   Good    PSYCHOMOTOR  Normal    SPEECH Volume: Normal     Rate: Normal rate and somewhat anxious tone    Amplitude: Within normal limits   MOOD  Anxious    AFFECT Range: Full, mood congruent, social smile present   THOUGHT Process:  Goal-Directed     Content: no evidence of psychosis    COGNITION Insight: Good    Judgement:  Intact    MEMORY  Intact    INTELLIGENCE  Average     Mobility/Gait: Independently     Controlled SubstancesMonitoring: Periodic Controlled Substance Monitoring: No signs of potential drug abuse or diversion identified. , Possible medication side effects, risk of tolerance/dependence & alternative treatments discussed. Levi Fernando MD)       ASSESSMENT: Will increase Effexor for anxiety and picking as it showed previous benefit for that. Ongoing health and work stressors. Effexor improved mood, skin-picking, energy, anhedonia, and motivation. Lyrica improved sleep and pain. Diagnosis Orders   1. Skin-picking disorder        2. Anxiety  pregabalin (LYRICA) 200 MG capsule    pregabalin (LYRICA) 100 MG capsule      3. Nerve pain  pregabalin (LYRICA) 200 MG capsule    pregabalin (LYRICA) 100 MG capsule      4. Depression, unspecified depression type        5.  Chronic pain syndrome        R/o: OCD, STEVEN, PTSD, panic d/o  H/o alcohol abuse, last drank 2 years ago      PLAN:     Medications:   Increase Effexor  mg QHS to 225 mg QHS (she may try taking in AM) - for anxiety, picking/compulsions, and depression  Lyrica 100 mg QAM and 200 mg QHS  - for pain, may have anxiety and sleep benefit   Trazodone 200 mg HS  - for insomnia and depression  No longer taking Melatonin 10 mg HS  Therapy: none, considering Stephanie Miller in [facility]  Labs/Tests/Imaging: none   Records Reviewed: [facility]  Patient advised to call if patient has any difficulties with treatment  Return in about 6 weeks (around 10/18/2022) for med check, follow up. Jasmin Denise, was evaluated through a synchronous (real-time) audio-video encounter. The patient (or guardian if applicable) is aware that this is a billable service, which includes applicable copays. This virtual visit was conducted with patient's (and/or legal guardian's) consent. The visit was conducted pursuant to the emergency declaration under the [address] [place] [address], [address] [place] [address] [facility] authority and the [employer] General Act. Patient identification was verified, and a caregiver was present when appropriate. The patient was located in a state where the provider was licensed to provide care.       Total time spent for this encounter: not billed by time    Electronically signed by Param Peacock MD on 9/6/2022 at 1:30 PM

## 2022-11-03 ENCOUNTER — TELEMEDICINE (OUTPATIENT)
Dept: PSYCHIATRY | Age: 52
End: 2022-11-03
Payer: COMMERCIAL

## 2022-11-03 DIAGNOSIS — F33.41 RECURRENT MAJOR DEPRESSIVE DISORDER, IN PARTIAL REMISSION (HCC): Primary | ICD-10-CM

## 2022-11-03 DIAGNOSIS — F42.4 SKIN-PICKING DISORDER: ICD-10-CM

## 2022-11-03 DIAGNOSIS — F41.9 ANXIETY: ICD-10-CM

## 2022-11-03 DIAGNOSIS — M79.2 NERVE PAIN: ICD-10-CM

## 2022-11-03 DIAGNOSIS — G89.4 CHRONIC PAIN SYNDROME: ICD-10-CM

## 2022-11-03 PROCEDURE — 99213 OFFICE O/P EST LOW 20 MIN: CPT | Performed by: PSYCHIATRY & NEUROLOGY

## 2022-11-03 PROCEDURE — G8427 DOCREV CUR MEDS BY ELIG CLIN: HCPCS | Performed by: PSYCHIATRY & NEUROLOGY

## 2022-11-03 PROCEDURE — 3017F COLORECTAL CA SCREEN DOC REV: CPT | Performed by: PSYCHIATRY & NEUROLOGY

## 2022-11-03 RX ORDER — VENLAFAXINE HYDROCHLORIDE 150 MG/1
150 CAPSULE, EXTENDED RELEASE ORAL DAILY
Qty: 90 CAPSULE | Refills: 0 | Status: SHIPPED | OUTPATIENT
Start: 2022-11-03

## 2022-11-03 RX ORDER — PREGABALIN 200 MG/1
200 CAPSULE ORAL NIGHTLY
Qty: 90 CAPSULE | Refills: 0 | Status: SHIPPED | OUTPATIENT
Start: 2022-11-03 | End: 2023-02-01

## 2022-11-03 RX ORDER — PREGABALIN 100 MG/1
100 CAPSULE ORAL EVERY MORNING
Qty: 90 CAPSULE | Refills: 0 | Status: SHIPPED | OUTPATIENT
Start: 2022-11-03 | End: 2023-02-01

## 2022-11-03 RX ORDER — VENLAFAXINE HYDROCHLORIDE 75 MG/1
75 CAPSULE, EXTENDED RELEASE ORAL DAILY
Qty: 90 CAPSULE | Refills: 0 | Status: SHIPPED | OUTPATIENT
Start: 2022-11-03

## 2022-11-03 RX ORDER — TRAZODONE HYDROCHLORIDE 100 MG/1
200 TABLET ORAL NIGHTLY
Qty: 180 TABLET | Refills: 0 | Status: SHIPPED | OUTPATIENT
Start: 2022-11-03

## 2022-11-03 NOTE — PROGRESS NOTES
[address] PSYCHIATRY  [facility] [phone]    Progress Note    Patient:  Deisy Marvin  YOB: 1970  PCP:  Rosalia Jeffery MD  Visit Date:  11/3/2022    TELEHEALTH EVALUATION -- Audio/Visual (During JQWOF-18 public health emergency)    Patient location: home  Physician location: [place], [facility]  This virtual visit was conducted via interactive, real-time video. Chief Complaint   Patient presents with    Follow-up    Medication Check    Anxiety    Depression         SUBJECTIVE:      Deisy Marvin, a 45 y. o. female, presents for a follow up visit. Patient reports she is doing well. Patient is compliant with medication regimen. She presents alone. Doing well overall despite several family and health stressors. Busy lately with work and medical appointments. Getting MRI next week to determine if she needs surgery for her foot, an ongoing issue. PT on hold for her spine due to her foot. Seeing PM&R doc at [facility]. Mood worse with health stressors. Typically optimistic but dealing with this the last year. Worries about whether nerve issues down her leg will be permanent. Anticipates holidays coming and feels she won't be able to celebrate like she wants. Son, WILL and 2 granddtrs moving to [place]. Other son and his wife having marital problems. Supporting them going to counseling by watching their kids. She was sick noting caught something her son had. Went to [facility] clinic and was started on Augmentin. Notes anxiety and mood benefit from Effexor increase. No longer picking. Seasons not wearing shorts are better. Had skin biopsy that came back negative. Declines to make any med changes \"I think I'm doing good. \"  Stopped melatonin awhile back noting it maybe woke her up more often. Sleep is ok, can fall asleep in 30 min.    Less anxiety in evening hours with Effexor increase. Med Trials:trazodone, Prozac, hydroxyzine, Lexapro, Lyrica, NAC, Cymbalta, Topamax, Elavil, Xanax, Effexor (benefit), Vistaril (RLS)    OBJECTIVE:  Vitals: There were no vitals taken for this visit. MENTAL STATUS EXAM:    GENERAL  Build: Overweight    Hygiene:  Appropriate    SENSORIUM Orientation: Place, Person, Time, & Situation     Consciousness: Alert    ATTENTION   Focused    RELATEDNESS  Cooperative    EYE CONTACT   Good    PSYCHOMOTOR  Normal    SPEECH Volume: Normal     Rate: Normal rate and at times somewhat down tone    Amplitude: Within normal limits   MOOD  Euthymic    AFFECT Range: Full, mood congruent   THOUGHT Process:  Goal-Directed     Content: no evidence of psychosis    COGNITION Insight: Good    Judgement:  Intact    MEMORY  Intact    INTELLIGENCE  Average     Mobility/Gait: Independently     Controlled SubstancesMonitoring: Periodic Controlled Substance Monitoring: Possible medication side effects, risk of tolerance/dependence & alternative treatments discussed., No signs of potential drug abuse or diversion identified. Berenice Koch MD)       ASSESSMENT: No changes today. Effexor increase improved mood, anxiety and picking. Effexor improved mood, skin-picking, energy, anhedonia, and motivation. Lyrica improved sleep and pain. Diagnosis Orders   1. Recurrent major depressive disorder, in partial remission ([facility] [address]        2. Anxiety  pregabalin (LYRICA) 200 MG capsule    pregabalin (LYRICA) 100 MG capsule      3. Nerve pain  pregabalin (LYRICA) 200 MG capsule    pregabalin (LYRICA) 100 MG capsule      4. Skin-picking disorder        5.  Chronic pain syndrome        R/o: OCD, STEVEN, PTSD, panic d/o  H/o alcohol abuse, last drank 2 years ago      PLAN:     Medications:   Effexor  mg QHS - for anxiety, picking/compulsions, and depression  Lyrica 100 mg QAM and 200 mg QHS  - for pain, may have anxiety and sleep benefit   Trazodone 200 mg HS  - for insomnia and depression  Therapy: none, considering [facility] in [place]  Labs/Tests/Imaging: none   Records Reviewed: [facility]  Patient advised to call if patient has any difficulties with treatment  Return in about 3 months (around 2/3/2023) for med check, follow up. Melissa Jett, was evaluated through a synchronous (real-time) audio-video encounter. The patient (or guardian if applicable) is aware that this is a billable service, which includes applicable copays. This virtual visit was conducted with patient's (and/or legal guardian's) consent. The visit was conducted pursuant to the emergency declaration under the [facility], [address] [place] [address] [facility] authority and the [employer] General Act. Patient identification was verified, and a caregiver was present when appropriate. The patient was located in a state where the provider was licensed to provide care.       Total time spent for this encounter: not billed by time    Electronically signed by Margaret Roberto MD on 11/3/2022 at 9:59 AM

## 2023-02-03 ENCOUNTER — HOSPITAL ENCOUNTER (OUTPATIENT)
Dept: WOMENS IMAGING | Age: 53
Discharge: HOME | End: 2023-02-03
Payer: COMMERCIAL

## 2023-02-03 DIAGNOSIS — Z12.31 VISIT FOR SCREENING MAMMOGRAM: ICD-10-CM

## 2023-02-03 PROCEDURE — 77063 BREAST TOMOSYNTHESIS BI: CPT

## 2023-03-03 DIAGNOSIS — F41.9 ANXIETY: ICD-10-CM

## 2023-03-03 DIAGNOSIS — M79.2 NERVE PAIN: ICD-10-CM

## 2023-03-03 NOTE — TELEPHONE ENCOUNTER
Deanne Bosworth left a voicemial requesting a refill on the following medications:  Requested Prescriptions     Pending Prescriptions Disp Refills    venlafaxine (EFFEXOR XR) 75 MG extended release capsule 90 capsule 0     Sig: Take 1 capsule by mouth daily Along with 150 mg cap for total dose 225 mg once daily     She also notes she has recent foot surgery. Her podiatrist recommends increasing the Lyrica an additional 100 mg in the afternoon for nerve pain and would like Dr. Mariza Rosales to increase this as Dr. Mariza Rosales prescribes this medication for the patient.      Date of last visit: 11/3/2022  Date of next visit (if applicable):3/23/2023  Pharmacy Name: [facility] [place]

## 2023-03-04 RX ORDER — PREGABALIN 100 MG/1
100 CAPSULE ORAL 2 TIMES DAILY
Qty: 180 CAPSULE | Refills: 0 | Status: SHIPPED | OUTPATIENT
Start: 2023-03-04 | End: 2023-06-02

## 2023-03-04 RX ORDER — VENLAFAXINE HYDROCHLORIDE 75 MG/1
75 CAPSULE, EXTENDED RELEASE ORAL DAILY
Qty: 90 CAPSULE | Refills: 0 | Status: SHIPPED | OUTPATIENT
Start: 2023-03-04

## 2023-03-04 RX ORDER — VENLAFAXINE HYDROCHLORIDE 150 MG/1
150 CAPSULE, EXTENDED RELEASE ORAL DAILY
Qty: 90 CAPSULE | Refills: 0 | Status: SHIPPED | OUTPATIENT
Start: 2023-03-04

## 2023-03-04 NOTE — TELEPHONE ENCOUNTER
I have changed Lyrica to 100 mg twice daily (morning and afternoon), and will continue 200 mg at night. 100 mg capsules sent to pharmacy with changes noted. Effexor also refilled.    Electronically signed by Marlin Jones MD on 3/4/2023 at 9:19 AM

## 2023-03-23 ENCOUNTER — TELEMEDICINE (OUTPATIENT)
Dept: PSYCHIATRY | Age: 53
End: 2023-03-23

## 2023-03-23 DIAGNOSIS — F42.4 SKIN-PICKING DISORDER: ICD-10-CM

## 2023-03-23 DIAGNOSIS — G89.4 CHRONIC PAIN SYNDROME: ICD-10-CM

## 2023-03-23 DIAGNOSIS — M79.2 NERVE PAIN: ICD-10-CM

## 2023-03-23 DIAGNOSIS — F33.41 RECURRENT MAJOR DEPRESSIVE DISORDER, IN PARTIAL REMISSION (HCC): Primary | ICD-10-CM

## 2023-03-23 DIAGNOSIS — F41.9 ANXIETY: ICD-10-CM

## 2023-03-23 RX ORDER — AMITRIPTYLINE HYDROCHLORIDE 25 MG/1
25 TABLET, FILM COATED ORAL NIGHTLY
Qty: 90 TABLET | Refills: 0 | Status: SHIPPED | OUTPATIENT
Start: 2023-03-23

## 2023-03-23 RX ORDER — TRAZODONE HYDROCHLORIDE 100 MG/1
200 TABLET ORAL NIGHTLY
Qty: 180 TABLET | Refills: 0 | Status: SHIPPED | OUTPATIENT
Start: 2023-03-23

## 2023-03-23 RX ORDER — PREGABALIN 200 MG/1
200 CAPSULE ORAL NIGHTLY
Qty: 90 CAPSULE | Refills: 0 | Status: SHIPPED | OUTPATIENT
Start: 2023-03-23 | End: 2023-06-21

## 2023-03-23 NOTE — PROGRESS NOTES
200 mg QHS  - for pain, may have anxiety and sleep benefit   Trazodone 200 mg HS  - for insomnia and depression  Start Elavil 25 mg QHS - discussed r/b/se/a  Therapy: none, considering [facility] in [place]  Labs/Tests/Imaging: none   Records Reviewed: [facility]  Patient advised to call if patient has any difficulties with treatment  Return in about 3 months (around 6/23/2023) for med check, follow up. I spent 24 minutes face to face with this patient. I spent 16 minutes or longer in psychotherapy discussing current stressors, coping skills, strategies for change, supportive therapy, psycheducation. Remainder of time spent on symptom evaluation and medication management. Gamaliel Avelar, was evaluated through a synchronous (real-time) audio-video encounter. The patient (or guardian if applicable) is aware that this is a billable service, which includes applicable copays. This virtual visit was conducted with patient's (and/or legal guardian's) consent. The visit was conducted pursuant to the emergency declaration under the [address] [place] [address], [address] [place] [address] [facility] authority and the [employer] General Act. Patient identification was verified, and a caregiver was present when appropriate. The patient was located in a state where the provider was licensed to provide care.       Total time spent for this encounter: 29 minutes    Electronically signed by Wilberto Contreras MD on 3/23/2023 at 11:35 AM

## 2023-06-05 RX ORDER — VENLAFAXINE HYDROCHLORIDE 150 MG/1
150 CAPSULE, EXTENDED RELEASE ORAL DAILY
Qty: 90 CAPSULE | Refills: 0 | Status: SHIPPED | OUTPATIENT
Start: 2023-06-05 | End: 2023-07-31 | Stop reason: SDUPTHER

## 2023-06-05 RX ORDER — VENLAFAXINE HYDROCHLORIDE 75 MG/1
CAPSULE, EXTENDED RELEASE ORAL
Qty: 90 CAPSULE | Refills: 0 | Status: SHIPPED | OUTPATIENT
Start: 2023-06-05 | End: 2023-08-01

## 2023-06-05 NOTE — TELEPHONE ENCOUNTER
[facility] is requesting a refill on the following medications:  Requested Prescriptions     Pending Prescriptions Disp Refills    venlafaxine (EFFEXOR XR) 75 MG extended release capsule [Pharmacy Med Name: VENLAFAXINE ER 75MG CAPSULES] 90 capsule 0     Sig: TAKE 1 CAPSULE BY MOUTH EVERY DAY ALONG WITH 150MG CAPSULE FOR TOTAL DOSE 225MG ONCE DAILY       Date of last visit: 3/23/2023  Date of next visit (if applicable):6/22/2023  Pharmacy Name: [facility], [address]

## 2023-06-22 ENCOUNTER — TELEMEDICINE (OUTPATIENT)
Dept: PSYCHIATRY | Age: 53
End: 2023-06-22
Payer: COMMERCIAL

## 2023-06-22 DIAGNOSIS — F42.4 SKIN-PICKING DISORDER: ICD-10-CM

## 2023-06-22 DIAGNOSIS — F41.9 ANXIETY: ICD-10-CM

## 2023-06-22 DIAGNOSIS — F33.1 MODERATE EPISODE OF RECURRENT MAJOR DEPRESSIVE DISORDER (HCC): Primary | ICD-10-CM

## 2023-06-22 DIAGNOSIS — G89.4 CHRONIC PAIN SYNDROME: ICD-10-CM

## 2023-06-22 DIAGNOSIS — M79.2 NERVE PAIN: ICD-10-CM

## 2023-06-22 PROCEDURE — 99214 OFFICE O/P EST MOD 30 MIN: CPT | Performed by: PSYCHIATRY & NEUROLOGY

## 2023-06-22 PROCEDURE — 3017F COLORECTAL CA SCREEN DOC REV: CPT | Performed by: PSYCHIATRY & NEUROLOGY

## 2023-06-22 PROCEDURE — 90833 PSYTX W PT W E/M 30 MIN: CPT | Performed by: PSYCHIATRY & NEUROLOGY

## 2023-06-22 PROCEDURE — G8427 DOCREV CUR MEDS BY ELIG CLIN: HCPCS | Performed by: PSYCHIATRY & NEUROLOGY

## 2023-06-22 RX ORDER — AMITRIPTYLINE HYDROCHLORIDE 25 MG/1
25 TABLET, FILM COATED ORAL 2 TIMES DAILY
Qty: 180 TABLET | Refills: 0 | Status: SHIPPED | OUTPATIENT
Start: 2023-06-22

## 2023-06-22 RX ORDER — TRAZODONE HYDROCHLORIDE 100 MG/1
200 TABLET ORAL NIGHTLY
Qty: 180 TABLET | Refills: 0 | Status: SHIPPED | OUTPATIENT
Start: 2023-06-22

## 2023-06-22 RX ORDER — PREGABALIN 200 MG/1
200 CAPSULE ORAL NIGHTLY
Qty: 90 CAPSULE | Refills: 0 | Status: SHIPPED | OUTPATIENT
Start: 2023-06-22 | End: 2023-09-20

## 2023-06-22 NOTE — PROGRESS NOTES
increase Elavil to BID. Will consider Effexor increase going forward. Spent much of session in psychotherapy discussing current stressors, coping skills, strategies for change, supportive therapy, psycheducation. Med Trials:trazodone, Prozac, hydroxyzine, Lexapro, Lyrica, NAC, Cymbalta, Topamax, Elavil, Xanax, Effexor (benefit), Vistaril (RLS)    OBJECTIVE:  Vitals: There were no vitals taken for this visit. MENTAL STATUS EXAM:    GENERAL  Build: Overweight    Hygiene:  Appropriate    SENSORIUM Orientation: Place, Person, Time, & Situation     Consciousness: Alert    ATTENTION   Focused    RELATEDNESS  Cooperative    EYE CONTACT   Good    PSYCHOMOTOR  Normal    SPEECH Volume: Normal     Rate: Normal rate and anxious tone    Amplitude: Within normal limits, talkative   MOOD  \"overwhelmed\"    AFFECT Range: Limited, mood congruent , social smile present   THOUGHT Process:  Goal-Directed     Content: no evidence of psychosis    COGNITION Insight: Good    Judgement:  Intact    MEMORY  Intact    INTELLIGENCE  Average     Mobility/Gait: Independently     Controlled SubstancesMonitoring: Periodic Controlled Substance Monitoring: No signs of potential drug abuse or diversion identified. , Possible medication side effects, risk of tolerance/dependence & alternative treatments discussed. Christin Calderon MD)       ASSESSMENT: Will increase Elavil to BID for pain, anxiety. Will consider Effexor increase going forward. Effexor improved mood, skin-picking, energy, anhedonia, and motivation. Lyrica improved sleep and pain. Elavil improved pain. Diagnosis Orders   1. Moderate episode of recurrent major depressive disorder ([address]        2. Anxiety  pregabalin (LYRICA) 200 MG capsule      3. Nerve pain  pregabalin (LYRICA) 200 MG capsule      4. Skin-picking disorder        5.  Chronic pain syndrome        R/o: OCD, STEVEN, PTSD, panic d/o  H/o alcohol abuse, last drank 2 years ago      PLAN:     Medications:   Effexor XR

## 2023-07-31 ENCOUNTER — OFFICE VISIT (OUTPATIENT)
Dept: PSYCHIATRY | Age: 53
End: 2023-07-31
Payer: COMMERCIAL

## 2023-07-31 DIAGNOSIS — F41.9 ANXIETY: ICD-10-CM

## 2023-07-31 DIAGNOSIS — F33.41 RECURRENT MAJOR DEPRESSIVE DISORDER, IN PARTIAL REMISSION (HCC): Primary | ICD-10-CM

## 2023-07-31 DIAGNOSIS — G89.4 CHRONIC PAIN SYNDROME: ICD-10-CM

## 2023-07-31 DIAGNOSIS — M79.2 NERVE PAIN: ICD-10-CM

## 2023-07-31 DIAGNOSIS — F42.4 SKIN-PICKING DISORDER: ICD-10-CM

## 2023-07-31 PROCEDURE — 90833 PSYTX W PT W E/M 30 MIN: CPT | Performed by: PSYCHIATRY & NEUROLOGY

## 2023-07-31 PROCEDURE — 99214 OFFICE O/P EST MOD 30 MIN: CPT | Performed by: PSYCHIATRY & NEUROLOGY

## 2023-07-31 PROCEDURE — 3017F COLORECTAL CA SCREEN DOC REV: CPT | Performed by: PSYCHIATRY & NEUROLOGY

## 2023-07-31 PROCEDURE — G8427 DOCREV CUR MEDS BY ELIG CLIN: HCPCS | Performed by: PSYCHIATRY & NEUROLOGY

## 2023-07-31 RX ORDER — VENLAFAXINE HYDROCHLORIDE 150 MG/1
300 CAPSULE, EXTENDED RELEASE ORAL DAILY
Qty: 180 CAPSULE | Refills: 0 | Status: SHIPPED | OUTPATIENT
Start: 2023-07-31

## 2023-07-31 NOTE — PROGRESS NOTES
[facility] PSYCHIATRY  [facility] [address] [address] [phone]    Progress Note    Patient:  Patrick Perla  YOB: 1970  PCP:  Bandar Gaston MD  Visit Date:  7/31/2023      Chief Complaint   Patient presents with    Follow-up    Medication Check    Depression    Anxiety       SUBJECTIVE:    Time in: 12:04pm  Time out: 12:35pm  Documentation time: 5 min    Patrick Perla, a 45 y. o. female, presents for a follow up visit. She presents alone. Notes health stressors. Finished PT for her ankle which is still healing. Sees Ortho in Aug at [facility]. Picking is worse lately. Shows me scabs on her legs. Stopped Elavil d/t possible wt gain. Unsure of cause. Notes lack of activity with her ankle. Discusses work stressors. Travels for work 1-2 days per week. Will have a bigger job coming up. Describes picking as a compulsion. Her brother does it. Wonders if it's hereditary. Her sister did the same thing. Her youngest son Lori [place]ton does it \"compulsively\". Mood is \"okay\". Anxiety is feeling uncontrolled. House projects make her feel stressed. Trying to get deck project done. The whole family helps out. Thyroid \"way off wack\". Was given Synthroid and threw things off. Working to adjust that. Started drinking again in ' 22 noting she had quit in '19. Was drinking wine back then. Notes the progression of her drinking. Started with a drink in a restaurant. Got [employer]. Then a bottle of whiskey. Drinking 3 days/week. Heavier day has 3 drinks. Doesn't want to drink. Bought a more diluted bottle of whiskey the 2nd time. .   Father, brother, sister had alcohol problems. We discussed meds for AUD such as naltrexone, Antabuse, Campral. We discussed strategy for her to talk about her concerns with her  and to make a plan to stop bringing alcohol into the house.  She would like to try quitting on her own before

## 2023-08-29 NOTE — PROGRESS NOTES
History:   Procedure Laterality Date    CARPAL TUNNEL RELEASE Bilateral 2001     SECTION  2005    GASTRIC RESTRICTION SURGERY  2016    gastric sleeve    KIDNEY STONE SURGERY      lithotripsy    KNEE ARTHROSCOPY Left 2018    Dr. Maryann Hoff      SHOULDER SURGERY Right 2009    SPINAL FUSION  2011    L4-S1 fusion - [facility]    UPPER GASTROINTESTINAL ENDOSCOPY  2016    VAGINAL PROLAPSE REPAIR  2003    RECTAL PROLAPSE       Current Outpatient Prescriptions   Medication Sig Dispense Refill    Pediatric Multivitamins-Iron (MULTIPLE VITAMINS-IRON PO) Take 1 tablet by mouth daily      docusate sodium (COLACE) 100 MG capsule Take 100 mg by mouth daily      levothyroxine (SYNTHROID) 150 MCG tablet Take 150 mcg by mouth Daily      topiramate (TOPAMAX) 50 MG tablet Take 50 mg by mouth nightly      cyclobenzaprine (FLEXERIL) 10 MG tablet Take 10 mg by mouth 3 times daily as needed for Muscle spasms      exenatide (BYETTA) 10 MCG/0.04ML injection Inject 0.04 mLs into the skin 2 times daily (with meals) 2.4 mL 1    Insulin Pen Needle 29G X 12MM MISC Use 1 pen needle 2 times daily with insulin 100 each 3    metFORMIN (GLUCOPHAGE) 500 MG tablet Take 1 tablet by mouth 2 times daily (with meals) 60 tablet 5    Dexlansoprazole (DEXILANT PO) Take 60 mg by mouth daily       DULoxetine (CYMBALTA) 60 MG extended release capsule Take 60 mg by mouth nightly      traZODone (DESYREL) 150 MG tablet Take 150 mg by mouth nightly      acyclovir (ZOVIRAX) 800 MG tablet Take 800 mg by mouth as needed      glipiZIDE (GLUCOTROL) 5 MG tablet Take 1 tablet by mouth daily 30 tablet 3    blood glucose monitor kit and supplies Test one  times a day & as needed for symptoms of irregular blood glucose. Dx: E11.9 One Touch Ultra 2 glucose meter 1 kit 0    blood glucose monitor strips One Touch Ultra 2  Glucose test strips.    Check sugars one time a day and as need for irregular blood glucose Dx: E11.9 100 strip 1    loratadine (CLARITIN) 10 MG tablet Take 1 tablet by mouth daily 30 tablet 0    fluticasone (FLONASE) 50 MCG/ACT nasal spray 2 sprays by Nasal route daily Apply daily to each nare 1 Bottle 0     No current facility-administered medications for this visit. Allergies   Allergen Reactions    Actos [Pioglitazone]      Edema      Dilaudid [Hydromorphone Hcl] Itching     Per [facility] RN, patient gets itchy with dilaudid (no hives); had a dose in PACU today with no issues.  Morphine Itching    Other      Outdated food like ketchup and peanut butter cause itching    Percocet [Oxycodone-Acetaminophen]      Makes pt pace       Social History     Social History    Marital status:      Spouse name: N/A    Number of children: N/A    Years of education: N/A     Occupational History    Not on file.      Social History Main Topics    Smoking status: Former Smoker     Packs/day: 0.25     Years: 19.00     Types: Cigarettes     Start date: 6/17/1987     Quit date: 1/1/1998    Smokeless tobacco: Former User    Alcohol use 0.0 oz/week      Comment: occasionally    Drug use: No    Sexual activity: Yes     Other Topics Concern    Not on file     Social History Narrative    No narrative on file       Family History   Problem Relation Age of Onset    Diabetes Mother     High Blood Pressure Mother     Heart Disease Mother     Obesity Mother     COPD Mother     Diabetes Father     Heart Attack Father     Diabetes Sister     Obesity Sister     Other Sister         [place]    High Blood Pressure Sister     High Blood Pressure Brother     Obesity Brother     Heart Attack Brother     Breast Cancer Paternal Grandmother     Diabetes Brother     Stroke Neg Hx     Cancer Neg Hx        Review of Systems - General ROS: negative for - chills or fever  Psychological ROS: negative for - anxiety and depression- picks skin with O-T Plasty Text: The defect edges were debeveled with a #15 scalpel blade.  Given the location of the defect, shape of the defect and the proximity to free margins an O-T plasty was deemed most appropriate.  Using a sterile surgical marker, an appropriate O-T plasty was drawn incorporating the defect and placing the expected incisions within the relaxed skin tension lines where possible.    The area thus outlined was incised deep to adipose tissue with a #15 scalpel blade.  The skin margins were undermined to an appropriate distance in all directions utilizing iris scissors.

## 2023-09-12 NOTE — TELEPHONE ENCOUNTER
Our office reached out to [facility] to r/s her appointment today due to the provider being out; she has been r/sed for 10/03/23. While on the phone; she said that she is needing a refill on her Trazodone 100mg;#180 with 0 refills;last with a start date of 06/22/2023. Notes that she has a refill on her Lyrica. She said that at this appointment it was supposed to be discussed about adding a new medication to her current Effexor medication for skin picking. Per chart; I seen it was mentioned about adding a AUD medication but nothing in addition to her Effexor? She said that the AUD is \"not an issue anymore\". She said that her drinking has decreased significantly from her last appointment to \"almost nothing\". She said \"there are no more hot summers out on the patio to drink\". She is interested still in adding a medication to her Effexor for skin picking. She said that the compulsion is still there all day everyday and her symptoms have not improved and have stayed about the same since the Effexor increase. Please advise. Medication for the Trazodone is pending your approval for a 30 day supply with 0 refills.

## 2023-09-14 RX ORDER — TRAZODONE HYDROCHLORIDE 100 MG/1
200 TABLET ORAL NIGHTLY
Qty: 60 TABLET | Refills: 0 | Status: SHIPPED | OUTPATIENT
Start: 2023-09-14

## 2023-09-14 NOTE — TELEPHONE ENCOUNTER
I spoke with Maciel Sánchez and informed her of this information; she notes that is unsure about switching the Effexor to something completely different but said that she has previously been on Buspar and does not remember why she stopped, she has heard of Abilify but does not know anything about Memantine. She said that she is going to look into her medical records and check about Buspar and look into the last option and give the office a call back. Awaiting call.

## 2023-09-14 NOTE — TELEPHONE ENCOUNTER
Rx sent. She might consider changing the Effexor to an alternative antidepressant. Another option would be to add a medication to the Effexor. We might consider Buspar, Abilify, or memantine. If she would like a sooner appointment to discuss the options further we can try to find an opening next week.    Electronically signed by Calli Bedoya MD on 9/14/2023 at 7:14 AM

## 2023-10-03 ENCOUNTER — TELEMEDICINE (OUTPATIENT)
Dept: PSYCHIATRY | Age: 53
End: 2023-10-03
Payer: COMMERCIAL

## 2023-10-03 DIAGNOSIS — F41.9 ANXIETY: ICD-10-CM

## 2023-10-03 DIAGNOSIS — G89.4 CHRONIC PAIN SYNDROME: ICD-10-CM

## 2023-10-03 DIAGNOSIS — M79.2 NERVE PAIN: ICD-10-CM

## 2023-10-03 DIAGNOSIS — F42.4 SKIN-PICKING DISORDER: ICD-10-CM

## 2023-10-03 DIAGNOSIS — F33.41 RECURRENT MAJOR DEPRESSIVE DISORDER, IN PARTIAL REMISSION (HCC): Primary | ICD-10-CM

## 2023-10-03 PROCEDURE — 3017F COLORECTAL CA SCREEN DOC REV: CPT | Performed by: PSYCHIATRY & NEUROLOGY

## 2023-10-03 PROCEDURE — 99214 OFFICE O/P EST MOD 30 MIN: CPT | Performed by: PSYCHIATRY & NEUROLOGY

## 2023-10-03 PROCEDURE — G8427 DOCREV CUR MEDS BY ELIG CLIN: HCPCS | Performed by: PSYCHIATRY & NEUROLOGY

## 2023-10-03 RX ORDER — VENLAFAXINE HYDROCHLORIDE 75 MG/1
75 CAPSULE, EXTENDED RELEASE ORAL DAILY
Qty: 90 CAPSULE | Refills: 0 | Status: SHIPPED | OUTPATIENT
Start: 2023-10-03

## 2023-10-03 RX ORDER — BUSPIRONE HYDROCHLORIDE 10 MG/1
10 TABLET ORAL 2 TIMES DAILY
Qty: 180 TABLET | Refills: 0 | Status: SHIPPED | OUTPATIENT
Start: 2023-10-03

## 2023-10-03 RX ORDER — TRAZODONE HYDROCHLORIDE 100 MG/1
200 TABLET ORAL NIGHTLY
Qty: 180 TABLET | Refills: 0 | Status: SHIPPED | OUTPATIENT
Start: 2023-10-03

## 2023-10-03 RX ORDER — PREGABALIN 200 MG/1
200 CAPSULE ORAL NIGHTLY
Qty: 90 CAPSULE | Refills: 0 | Status: SHIPPED | OUTPATIENT
Start: 2023-10-03 | End: 2024-01-01

## 2023-10-03 NOTE — PROGRESS NOTES
[facility] PSYCHIATRY  [facility] [address] [address] [phone]    Progress Note    Patient:  Monica Robledo  YOB: 1970  PCP:  Aracelis Sena MD  Visit Date:  10/3/2023      Chief Complaint   Patient presents with    Follow-up    Medication Check    Anxiety    Depression    Chronic Pain       SUBJECTIVE:      Monica Robledo, a 52 y. o. female, presents for a follow up visit. She presents alone. Mood feeling \"more anxious\" than usual.   PMA. Fidgety. Restless. Discussed whether that's from the Effexor increase. No benefit for the picking. Picking actually a bit worse which discussed with her I also suspect is the Effexor. She notes her picking worsened in the last year which follows when Effexor was increased from 150 mg to 225 mg back in Sept '22 per chart review. Trouble falling asleep d/t racing thoughts Once trazodone kicks in she can stay sleep. Trying to take meds earlier. Notes when she wears pants will be less likely to pick with cooler weather coming. Not drinking. No SI or psychosis. Discussed tx options and we agree to decrease Effexor and will start Buspar for anxiety. Med Trials:trazodone, Prozac, hydroxyzine, Lexapro, Lyrica, NAC, Cymbalta, Topamax, Elavil, Xanax, Effexor (benefit), Vistaril (RLS), Buspar    OBJECTIVE:  Vitals: There were no vitals taken for this visit. MENTAL STATUS EXAM:    GENERAL  Build: Overweight    Hygiene:  Appropriate   SENSORIUM Orientation: Place, Person, Time, & Situation     Consciousness: Alert    ATTENTION   Focused    RELATEDNESS  Cooperative    EYE CONTACT   Good    PSYCHOMOTOR  Normal    SPEECH Volume: Normal     Rate: Normal rate and at times somewhat anxious tone    Amplitude:  Within normal limits   MOOD  \"\"More anxious\"   AFFECT Range: Full   THOUGHT Process:  Goal-Directed     Content: no evidence of psychosis    COGNITION Insight: Good

## 2023-10-16 ENCOUNTER — TELEPHONE (OUTPATIENT)
Dept: PSYCHIATRY | Age: 53
End: 2023-10-16

## 2023-10-16 NOTE — TELEPHONE ENCOUNTER
Vera Montoya wrote into the office via [employer]:    Hi - I would like to increase my buspar to 20 mg twice daily. I am still having anxiety issues and have been picking. It is getting a little better - but the doc said something about increasing the dose. I am currently taking 10 mg twice daily. Thanks ! Please advise, she does not return until 12/12/23. Last seen on 10/03/23 where she was started on Buspar 5mg BID and notes that she could increase to 10mg BID if needed.

## 2023-10-16 NOTE — TELEPHONE ENCOUNTER
She will use her 10 mg tablets to make the new 20 mg twice daily dose (taking 2 tablets twice daily). I will send a new Rx when needed.    (They don't make 20 mg Buspar tabs)  Electronically signed by Cesar Womack MD on 10/16/2023 at 9:12 AM

## 2023-10-16 NOTE — TELEPHONE ENCOUNTER
Zoë Bedoya wrote back into the office via [employer]: Thank you! I will let you know how the increase is working in a couple of weeks.

## 2023-12-12 ENCOUNTER — TELEMEDICINE (OUTPATIENT)
Dept: PSYCHIATRY | Age: 53
End: 2023-12-12
Payer: COMMERCIAL

## 2023-12-12 DIAGNOSIS — M79.2 NERVE PAIN: ICD-10-CM

## 2023-12-12 DIAGNOSIS — F41.9 ANXIETY: ICD-10-CM

## 2023-12-12 DIAGNOSIS — F33.41 RECURRENT MAJOR DEPRESSIVE DISORDER, IN PARTIAL REMISSION (HCC): Primary | ICD-10-CM

## 2023-12-12 DIAGNOSIS — G89.4 CHRONIC PAIN SYNDROME: ICD-10-CM

## 2023-12-12 DIAGNOSIS — F42.4 SKIN-PICKING DISORDER: ICD-10-CM

## 2023-12-12 PROCEDURE — 3017F COLORECTAL CA SCREEN DOC REV: CPT | Performed by: PSYCHIATRY & NEUROLOGY

## 2023-12-12 PROCEDURE — G8427 DOCREV CUR MEDS BY ELIG CLIN: HCPCS | Performed by: PSYCHIATRY & NEUROLOGY

## 2023-12-12 PROCEDURE — 90833 PSYTX W PT W E/M 30 MIN: CPT | Performed by: PSYCHIATRY & NEUROLOGY

## 2023-12-12 PROCEDURE — 99214 OFFICE O/P EST MOD 30 MIN: CPT | Performed by: PSYCHIATRY & NEUROLOGY

## 2023-12-12 RX ORDER — VENLAFAXINE HYDROCHLORIDE 75 MG/1
75 CAPSULE, EXTENDED RELEASE ORAL DAILY
Qty: 90 CAPSULE | Refills: 0 | Status: SHIPPED | OUTPATIENT
Start: 2023-12-12

## 2023-12-12 RX ORDER — VENLAFAXINE HYDROCHLORIDE 150 MG/1
150 CAPSULE, EXTENDED RELEASE ORAL DAILY
Qty: 90 CAPSULE | Refills: 0 | Status: SHIPPED | OUTPATIENT
Start: 2023-12-12

## 2023-12-12 RX ORDER — BUSPIRONE HYDROCHLORIDE 10 MG/1
10 TABLET ORAL 2 TIMES DAILY
Qty: 180 TABLET | Refills: 0 | Status: SHIPPED | OUTPATIENT
Start: 2023-12-12 | End: 2023-12-12 | Stop reason: SDUPTHER

## 2023-12-12 RX ORDER — BUSPIRONE HYDROCHLORIDE 10 MG/1
20 TABLET ORAL 2 TIMES DAILY
Qty: 360 TABLET | Refills: 0 | Status: SHIPPED | OUTPATIENT
Start: 2023-12-12

## 2023-12-12 RX ORDER — TRAZODONE HYDROCHLORIDE 100 MG/1
200 TABLET ORAL NIGHTLY
Qty: 180 TABLET | Refills: 0 | Status: SHIPPED | OUTPATIENT
Start: 2023-12-12

## 2023-12-12 RX ORDER — PREGABALIN 200 MG/1
200 CAPSULE ORAL NIGHTLY
Qty: 90 CAPSULE | Refills: 0 | Status: SHIPPED | OUTPATIENT
Start: 2023-12-12 | End: 2024-03-11

## 2023-12-12 NOTE — PROGRESS NOTES
[facility] PSYCHIATRY  [facility] [address] [address] [phone]    Progress Note    Patient:  Emery Matthew  YOB: 1970  PCP:  Indira Stern MD  Visit Date:  12/12/2023      Chief Complaint   Patient presents with    Follow-up    Medication Check    Depression    Anxiety    Chronic Pain       SUBJECTIVE:    Time in: 9:35am  Time out: 9:59am  Documentation time: 5 min    Emery Matthew, a 52 y. o. female, presents for a follow up visit. She presents alone. Health stressors. Sick with sinus issues and stomach bug. Was dx with influenza around 12/1 and now over that. Can only tolerate clear liquids. Diarrhea. Several family members are ill as well. Got a new puppy. Has an older dog. Mentally \"okay\". Notes this is her favorite season/time of year. Can enjoy it better this year noting 2022 was difficult with back and foot surgery. Mood \"stressed\" with buying gifts, decorating. Her son's family lives in [place], got to see them in Nov and misses them. Still picking. Tends to pick at outer aspect of her thigh noting her hand is often there. Better this time of year wearing pants. Has dry skin blaming her diabetes and thyroid issues. Hasn't started picking at dry skin this year. Now on lower dose Effexor. Doesn't feel any worse with that. \"Vast improvement\" in her anxiety on lower dose. Now on Buspar. Using 20 mg once daily noting it's hard to remember to take it in daytime. She asks about Ashwagandha and we discussed that. I directed her to [employer] website stating there may be interactions with diabetic, antihypertensive, or thyroid medication, all of which she's taking. Lost 10 lbs with Mounjaro. Lost more weight with the flu. No SI or psychosis. Discussed tx options and we agree to increase Buspar to BID for anxiety.   Spent much of session in psychotherapy discussing current stressors, coping skills,

## 2024-01-18 ENCOUNTER — TELEPHONE (OUTPATIENT)
Dept: PSYCHIATRY | Age: 54
End: 2024-01-18

## 2024-01-18 NOTE — TELEPHONE ENCOUNTER
Zakiya called into the office stating that she is going to [place] from 02/21/24 to 02/28/24 and she has a major fear of flying in a plane so she is asking if this provider can provide some medication to get her through the flights. She said that she went to [place] back in 2003 and was prescribed Xanax for that flight and it worked well. She said that she is unable to stop at the airport bar because she has stopped drinking altogether. She said the other solution would be to take her nighttime medications prior to the flight due to their sedative feature but she really does not want to do that.    Please advise. Due to her not leaving until the end of February; I informed her that she may need to call back just a few days prior to leaving since its a month out; she understood.

## 2024-01-18 NOTE — TELEPHONE ENCOUNTER
We can provide a small supply of Xanax prior to her trip.   Electronically signed by Cherie Doe MD on 1/18/2024 at 9:54 AM

## 2024-02-14 ENCOUNTER — TELEPHONE (OUTPATIENT)
Dept: PSYCHIATRY | Age: 54
End: 2024-02-14

## 2024-02-14 DIAGNOSIS — F41.9 ANXIETY: Primary | ICD-10-CM

## 2024-02-14 RX ORDER — ALPRAZOLAM 0.5 MG/1
0.5 TABLET ORAL DAILY PRN
Qty: 7 TABLET | Refills: 0 | Status: SHIPPED | OUTPATIENT
Start: 2024-02-14 | End: 2024-02-21

## 2024-02-14 NOTE — TELEPHONE ENCOUNTER
Zakiya called into the office stating that she had called last month in regards to an upcoming vacation to [place] and due to her anxiety about flying, she had asked about a temporary prescription to help with this.     *Per chart; this provider had approved for a short term supply of Xanax (encounter dated 01/18/24). She notes that she is leaving on Wednesday next week and will be there 1 week.    Please advise on Xanax prescription? She is using [facility] on [address].

## 2024-03-08 ENCOUNTER — HOSPITAL ENCOUNTER (OUTPATIENT)
Dept: WOMENS IMAGING | Age: 54
Discharge: HOME | End: 2024-03-08
Payer: COMMERCIAL

## 2024-03-08 VITALS — HEIGHT: 67 IN | WEIGHT: 286 LBS | BODY MASS INDEX: 44.89 KG/M2

## 2024-03-08 DIAGNOSIS — Z12.31 VISIT FOR SCREENING MAMMOGRAM: ICD-10-CM

## 2024-03-08 PROCEDURE — 77063 BREAST TOMOSYNTHESIS BI: CPT

## 2024-03-12 ENCOUNTER — TELEMEDICINE (OUTPATIENT)
Dept: PSYCHIATRY | Age: 54
End: 2024-03-12
Payer: COMMERCIAL

## 2024-03-12 DIAGNOSIS — F33.41 RECURRENT MAJOR DEPRESSIVE DISORDER, IN PARTIAL REMISSION (HCC): Primary | ICD-10-CM

## 2024-03-12 DIAGNOSIS — G89.4 CHRONIC PAIN SYNDROME: ICD-10-CM

## 2024-03-12 DIAGNOSIS — F41.9 ANXIETY: ICD-10-CM

## 2024-03-12 DIAGNOSIS — M79.2 NERVE PAIN: ICD-10-CM

## 2024-03-12 DIAGNOSIS — F42.4 SKIN-PICKING DISORDER: ICD-10-CM

## 2024-03-12 PROCEDURE — 3017F COLORECTAL CA SCREEN DOC REV: CPT | Performed by: PSYCHIATRY & NEUROLOGY

## 2024-03-12 PROCEDURE — 99214 OFFICE O/P EST MOD 30 MIN: CPT | Performed by: PSYCHIATRY & NEUROLOGY

## 2024-03-12 PROCEDURE — 90833 PSYTX W PT W E/M 30 MIN: CPT | Performed by: PSYCHIATRY & NEUROLOGY

## 2024-03-12 PROCEDURE — G8427 DOCREV CUR MEDS BY ELIG CLIN: HCPCS | Performed by: PSYCHIATRY & NEUROLOGY

## 2024-03-12 RX ORDER — PREGABALIN 200 MG/1
200 CAPSULE ORAL NIGHTLY
Qty: 90 CAPSULE | Refills: 0 | Status: SHIPPED | OUTPATIENT
Start: 2024-03-12 | End: 2024-06-10

## 2024-03-12 RX ORDER — VENLAFAXINE HYDROCHLORIDE 150 MG/1
150 CAPSULE, EXTENDED RELEASE ORAL DAILY
Qty: 90 CAPSULE | Refills: 0 | Status: SHIPPED | OUTPATIENT
Start: 2024-03-12

## 2024-03-12 RX ORDER — TRAZODONE HYDROCHLORIDE 100 MG/1
200 TABLET ORAL NIGHTLY
Qty: 180 TABLET | Refills: 0 | Status: SHIPPED | OUTPATIENT
Start: 2024-03-12

## 2024-03-12 RX ORDER — BUSPIRONE HYDROCHLORIDE 10 MG/1
20 TABLET ORAL 2 TIMES DAILY
Qty: 360 TABLET | Refills: 0 | Status: SHIPPED | OUTPATIENT
Start: 2024-03-12

## 2024-03-12 NOTE — PROGRESS NOTES
offer  Labs/Tests/Imaging: none   Records Reviewed: [facility]  Patient advised to call if patient has any difficulties with treatment  Return in about 4 weeks (around 4/9/2024) for follow up, med check.   I spent 27 minutes face to face with this patient. I spent 16 minutes or longer in psychotherapy discussing current stressors, coping skills, strategies for change, supportive therapy, psycheducation. Remainder of time spent on symptom evaluation and medication management.       Ivanna Mayes, was evaluated through a synchronous (real-time) audio-video encounter. The patient (or guardian if applicable) is aware that this is a billable service, which includes applicable co-pays. This Virtual Visit was conducted with patient's (and/or legal guardian's) consent. Patient identification was verified, and a caregiver was present when appropriate.   The patient was located at Home: [address]  [facility] [address]  Provider was located at Home (Appt Dept State): [place]       Total time spent for this encounter:  32 minutes    --Cherie Doe MD on 3/12/2024 at 10:39 AM    An electronic signature was used to authenticate this note.

## 2024-04-16 ENCOUNTER — TELEMEDICINE (OUTPATIENT)
Dept: PSYCHIATRY | Age: 54
End: 2024-04-16
Payer: COMMERCIAL

## 2024-04-16 DIAGNOSIS — F42.4 SKIN-PICKING DISORDER: ICD-10-CM

## 2024-04-16 DIAGNOSIS — F41.9 ANXIETY: ICD-10-CM

## 2024-04-16 DIAGNOSIS — F33.42 RECURRENT MAJOR DEPRESSIVE DISORDER, IN FULL REMISSION (HCC): Primary | ICD-10-CM

## 2024-04-16 DIAGNOSIS — M79.2 NERVE PAIN: ICD-10-CM

## 2024-04-16 PROCEDURE — G8427 DOCREV CUR MEDS BY ELIG CLIN: HCPCS | Performed by: PSYCHIATRY & NEUROLOGY

## 2024-04-16 PROCEDURE — 3017F COLORECTAL CA SCREEN DOC REV: CPT | Performed by: PSYCHIATRY & NEUROLOGY

## 2024-04-16 PROCEDURE — 90833 PSYTX W PT W E/M 30 MIN: CPT | Performed by: PSYCHIATRY & NEUROLOGY

## 2024-04-16 PROCEDURE — 99213 OFFICE O/P EST LOW 20 MIN: CPT | Performed by: PSYCHIATRY & NEUROLOGY

## 2024-04-16 RX ORDER — BUSPIRONE HYDROCHLORIDE 10 MG/1
20 TABLET ORAL 2 TIMES DAILY
Qty: 360 TABLET | Refills: 0 | Status: SHIPPED | OUTPATIENT
Start: 2024-04-16

## 2024-04-16 RX ORDER — TRAZODONE HYDROCHLORIDE 100 MG/1
200 TABLET ORAL NIGHTLY
Qty: 180 TABLET | Refills: 0 | Status: SHIPPED | OUTPATIENT
Start: 2024-04-16

## 2024-04-16 RX ORDER — VENLAFAXINE HYDROCHLORIDE 150 MG/1
150 CAPSULE, EXTENDED RELEASE ORAL DAILY
Qty: 90 CAPSULE | Refills: 0 | Status: SHIPPED | OUTPATIENT
Start: 2024-04-16

## 2024-04-16 RX ORDER — PREGABALIN 200 MG/1
200 CAPSULE ORAL NIGHTLY
Qty: 90 CAPSULE | Refills: 0 | Status: SHIPPED | OUTPATIENT
Start: 2024-04-16 | End: 2024-07-15

## 2024-04-16 NOTE — PROGRESS NOTES
pain, may have anxiety and sleep benefit   Trazodone 200 mg HS  - for insomnia and depression  Buspar 20 mg BID   Therapy: none, will continue to offer, consider CBTi group  Labs/Tests/Imaging: none   Records Reviewed: [facility]  Patient advised to call if patient has any difficulties with treatment  Return in about 3 months (around 7/16/2024) for follow up, med check.   I spent 23 minutes face to face with this patient. I spent 16 minutes or longer in psychotherapy discussing current stressors, coping skills, strategies for change, supportive therapy, psycheducation. Remainder of time spent on symptom evaluation and medication management.       Ivanna Mayes, was evaluated through a synchronous (real-time) audio-video encounter. The patient (or guardian if applicable) is aware that this is a billable service, which includes applicable co-pays. This Virtual Visit was conducted with patient's (and/or legal guardian's) consent. Patient identification was verified, and a caregiver was present when appropriate.   The patient was located at Home: [address] [place] [id number]  Provider was located at Home (Appt Dept State): [place]       Total time spent for this encounter:  28 minutes    --Cherie Doe MD on 4/16/2024 at 10:47 AM    An electronic signature was used to authenticate this note.

## 2024-04-17 DIAGNOSIS — F41.9 ANXIETY: ICD-10-CM

## 2024-04-17 RX ORDER — ALPRAZOLAM 0.5 MG/1
0.5 TABLET ORAL DAILY PRN
Qty: 7 TABLET | Refills: 0 | Status: SHIPPED | OUTPATIENT
Start: 2024-04-17 | End: 2024-04-24

## 2024-04-17 NOTE — TELEPHONE ENCOUNTER
Zakiya wrote into the office via [facility]:    Can you please prescribe the Xanax again as I am flying to [place] on May 4th and returning on May 11th.     Thank you!    Please advise; she is scheduled to return on 07/16/24. Last seen yesterday. *medication is loaded for the Xanax 0.5mg (once daily prn) temporary supply (1 week supply) with 0 refills. Please advise otherwise.

## 2024-04-26 ENCOUNTER — HOSPITAL ENCOUNTER (OUTPATIENT)
Dept: ULTRASOUND IMAGING | Age: 54
Discharge: HOME | End: 2024-04-26
Payer: COMMERCIAL

## 2024-04-26 DIAGNOSIS — R10.11 RUQ ABDOMINAL PAIN: ICD-10-CM

## 2024-04-26 PROCEDURE — 76705 ECHO EXAM OF ABDOMEN: CPT

## 2024-05-21 RX ORDER — MELOXICAM 15 MG/1
15 TABLET ORAL DAILY
Qty: 30 TABLET | OUTPATIENT
Start: 2024-05-21

## 2024-05-21 RX ORDER — CYCLOBENZAPRINE HCL 10 MG
10 TABLET ORAL 3 TIMES DAILY
Qty: 50 TABLET | OUTPATIENT
Start: 2024-05-21

## 2024-07-24 ENCOUNTER — TELEMEDICINE (OUTPATIENT)
Dept: PSYCHIATRY | Age: 54
End: 2024-07-24
Payer: COMMERCIAL

## 2024-07-24 DIAGNOSIS — F41.9 ANXIETY: ICD-10-CM

## 2024-07-24 DIAGNOSIS — F33.1 MODERATE EPISODE OF RECURRENT MAJOR DEPRESSIVE DISORDER (HCC): Primary | ICD-10-CM

## 2024-07-24 DIAGNOSIS — M79.2 NERVE PAIN: ICD-10-CM

## 2024-07-24 DIAGNOSIS — F42.4 SKIN-PICKING DISORDER: ICD-10-CM

## 2024-07-24 PROCEDURE — G8427 DOCREV CUR MEDS BY ELIG CLIN: HCPCS | Performed by: PSYCHIATRY & NEUROLOGY

## 2024-07-24 PROCEDURE — 90833 PSYTX W PT W E/M 30 MIN: CPT | Performed by: PSYCHIATRY & NEUROLOGY

## 2024-07-24 PROCEDURE — 3017F COLORECTAL CA SCREEN DOC REV: CPT | Performed by: PSYCHIATRY & NEUROLOGY

## 2024-07-24 PROCEDURE — 99214 OFFICE O/P EST MOD 30 MIN: CPT | Performed by: PSYCHIATRY & NEUROLOGY

## 2024-07-24 RX ORDER — BUSPIRONE HYDROCHLORIDE 30 MG/1
30 TABLET ORAL 2 TIMES DAILY
Qty: 180 TABLET | Refills: 0 | Status: SHIPPED | OUTPATIENT
Start: 2024-07-24

## 2024-07-24 RX ORDER — TRAZODONE HYDROCHLORIDE 100 MG/1
200 TABLET ORAL NIGHTLY
Qty: 180 TABLET | Refills: 0 | Status: SHIPPED | OUTPATIENT
Start: 2024-07-24

## 2024-07-24 RX ORDER — VENLAFAXINE HYDROCHLORIDE 150 MG/1
150 CAPSULE, EXTENDED RELEASE ORAL DAILY
Qty: 90 CAPSULE | Refills: 0 | Status: SHIPPED | OUTPATIENT
Start: 2024-07-24

## 2024-07-24 RX ORDER — PREGABALIN 200 MG/1
200 CAPSULE ORAL NIGHTLY
Qty: 90 CAPSULE | Refills: 0 | Status: SHIPPED | OUTPATIENT
Start: 2024-07-24 | End: 2024-10-22

## 2024-07-24 NOTE — PROGRESS NOTES
Skin-picking disorder        R/o: OCD, STEVEN, panic d/o, AUD      PLAN:     Medications:   Effexor  mg QHS - for anxiety, picking/compulsions, and depression  Lyrica 200 mg QHS  - for pain, may have anxiety and sleep benefit   Trazodone 200 mg HS  - for insomnia and depression  Increase Buspar 20 mg to 30 mg BID   Therapy: none, will continue to offer, consider CBTi group  Labs/Tests/Imaging: none   Records Reviewed: [facility]  Patient advised to call if patient has any difficulties with treatment  Return in about 6 weeks (around 9/4/2024) for med check, follow up.   I spent 28 minutes face to face with this patient. I spent 16 minutes or longer in psychotherapy discussing current stressors, coping skills, strategies for change, supportive therapy, psycheducation. Remainder of time spent on symptom evaluation and medication management.       Ivanna Mayes, was evaluated through a synchronous (real-time) audio-video encounter. The patient (or guardian if applicable) is aware that this is a billable service, which includes applicable co-pays. This Virtual Visit was conducted with patient's (and/or legal guardian's) consent. Patient identification was verified, and a caregiver was present when appropriate.   The patient was located at Home: [address]  [facility] [address]  Provider was located at Home (Appt Dept State): [place]       Total time spent for this encounter:  33 minutes    --Cherie Doe MD on 7/24/2024 at 10:54 AM    An electronic signature was used to authenticate this note.

## 2024-09-27 ENCOUNTER — LAB (OUTPATIENT)
Dept: LAB | Age: 54
End: 2024-09-27

## 2024-10-08 LAB — CYTOLOGY THIN PREP PAP: NORMAL

## 2024-10-29 ENCOUNTER — TELEMEDICINE (OUTPATIENT)
Dept: PSYCHIATRY | Age: 54
End: 2024-10-29
Payer: COMMERCIAL

## 2024-10-29 DIAGNOSIS — F33.42 RECURRENT MAJOR DEPRESSIVE DISORDER, IN FULL REMISSION (HCC): Primary | ICD-10-CM

## 2024-10-29 DIAGNOSIS — F41.9 ANXIETY: ICD-10-CM

## 2024-10-29 DIAGNOSIS — F42.4 SKIN-PICKING DISORDER: ICD-10-CM

## 2024-10-29 DIAGNOSIS — M79.2 NERVE PAIN: ICD-10-CM

## 2024-10-29 PROCEDURE — G8427 DOCREV CUR MEDS BY ELIG CLIN: HCPCS | Performed by: PSYCHIATRY & NEUROLOGY

## 2024-10-29 PROCEDURE — 3017F COLORECTAL CA SCREEN DOC REV: CPT | Performed by: PSYCHIATRY & NEUROLOGY

## 2024-10-29 PROCEDURE — 99213 OFFICE O/P EST LOW 20 MIN: CPT | Performed by: PSYCHIATRY & NEUROLOGY

## 2024-10-29 RX ORDER — BUSPIRONE HYDROCHLORIDE 30 MG/1
30 TABLET ORAL 2 TIMES DAILY
Qty: 180 TABLET | Refills: 0 | Status: SHIPPED | OUTPATIENT
Start: 2024-10-29

## 2024-10-29 RX ORDER — TRAZODONE HYDROCHLORIDE 100 MG/1
200 TABLET ORAL NIGHTLY
Qty: 180 TABLET | Refills: 0 | Status: SHIPPED | OUTPATIENT
Start: 2024-10-29

## 2024-10-29 RX ORDER — VENLAFAXINE HYDROCHLORIDE 150 MG/1
150 CAPSULE, EXTENDED RELEASE ORAL DAILY
Qty: 90 CAPSULE | Refills: 0 | Status: SHIPPED | OUTPATIENT
Start: 2024-10-29

## 2024-10-29 RX ORDER — PREGABALIN 200 MG/1
200 CAPSULE ORAL NIGHTLY
Qty: 90 CAPSULE | Refills: 0 | Status: SHIPPED | OUTPATIENT
Start: 2024-10-29 | End: 2025-01-27

## 2024-10-29 ASSESSMENT — PATIENT HEALTH QUESTIONNAIRE - PHQ9
8. MOVING OR SPEAKING SO SLOWLY THAT OTHER PEOPLE COULD HAVE NOTICED. OR THE OPPOSITE - BEING SO FIDGETY OR RESTLESS THAT YOU HAVE BEEN MOVING AROUND A LOT MORE THAN USUAL: NOT AT ALL
4. FEELING TIRED OR HAVING LITTLE ENERGY: SEVERAL DAYS
SUM OF ALL RESPONSES TO PHQ9 QUESTIONS 1 & 2: 1
9. THOUGHTS THAT YOU WOULD BE BETTER OFF DEAD, OR OF HURTING YOURSELF: NOT AT ALL
5. POOR APPETITE OR OVEREATING: NOT AT ALL
1. LITTLE INTEREST OR PLEASURE IN DOING THINGS: SEVERAL DAYS
1. LITTLE INTEREST OR PLEASURE IN DOING THINGS: SEVERAL DAYS
SUM OF ALL RESPONSES TO PHQ QUESTIONS 1-9: 4
5. POOR APPETITE OR OVEREATING: NOT AT ALL
SUM OF ALL RESPONSES TO PHQ QUESTIONS 1-9: 4
SUM OF ALL RESPONSES TO PHQ QUESTIONS 1-9: 4
2. FEELING DOWN, DEPRESSED OR HOPELESS: NOT AT ALL
3. TROUBLE FALLING OR STAYING ASLEEP: MORE THAN HALF THE DAYS
10. IF YOU CHECKED OFF ANY PROBLEMS, HOW DIFFICULT HAVE THESE PROBLEMS MADE IT FOR YOU TO DO YOUR WORK, TAKE CARE OF THINGS AT HOME, OR GET ALONG WITH OTHER PEOPLE: NOT DIFFICULT AT ALL
6. FEELING BAD ABOUT YOURSELF - OR THAT YOU ARE A FAILURE OR HAVE LET YOURSELF OR YOUR FAMILY DOWN: NOT AT ALL
8. MOVING OR SPEAKING SO SLOWLY THAT OTHER PEOPLE COULD HAVE NOTICED. OR THE OPPOSITE, BEING SO FIGETY OR RESTLESS THAT YOU HAVE BEEN MOVING AROUND A LOT MORE THAN USUAL: NOT AT ALL
10. IF YOU CHECKED OFF ANY PROBLEMS, HOW DIFFICULT HAVE THESE PROBLEMS MADE IT FOR YOU TO DO YOUR WORK, TAKE CARE OF THINGS AT HOME, OR GET ALONG WITH OTHER PEOPLE: NOT DIFFICULT AT ALL
4. FEELING TIRED OR HAVING LITTLE ENERGY: SEVERAL DAYS
2. FEELING DOWN, DEPRESSED OR HOPELESS: NOT AT ALL
SUM OF ALL RESPONSES TO PHQ QUESTIONS 1-9: 4
SUM OF ALL RESPONSES TO PHQ QUESTIONS 1-9: 4
7. TROUBLE CONCENTRATING ON THINGS, SUCH AS READING THE NEWSPAPER OR WATCHING TELEVISION: NOT AT ALL
9. THOUGHTS THAT YOU WOULD BE BETTER OFF DEAD, OR OF HURTING YOURSELF: NOT AT ALL
6. FEELING BAD ABOUT YOURSELF - OR THAT YOU ARE A FAILURE OR HAVE LET YOURSELF OR YOUR FAMILY DOWN: NOT AT ALL
3. TROUBLE FALLING OR STAYING ASLEEP: MORE THAN HALF THE DAYS
7. TROUBLE CONCENTRATING ON THINGS, SUCH AS READING THE NEWSPAPER OR WATCHING TELEVISION: NOT AT ALL

## 2024-10-29 ASSESSMENT — ANXIETY QUESTIONNAIRES
6. BECOMING EASILY ANNOYED OR IRRITABLE: SEVERAL DAYS
IF YOU CHECKED OFF ANY PROBLEMS ON THIS QUESTIONNAIRE, HOW DIFFICULT HAVE THESE PROBLEMS MADE IT FOR YOU TO DO YOUR WORK, TAKE CARE OF THINGS AT HOME, OR GET ALONG WITH OTHER PEOPLE: NOT DIFFICULT AT ALL
1. FEELING NERVOUS, ANXIOUS, OR ON EDGE: SEVERAL DAYS
4. TROUBLE RELAXING: SEVERAL DAYS
6. BECOMING EASILY ANNOYED OR IRRITABLE: SEVERAL DAYS
5. BEING SO RESTLESS THAT IT IS HARD TO SIT STILL: NOT AT ALL
4. TROUBLE RELAXING: SEVERAL DAYS
2. NOT BEING ABLE TO STOP OR CONTROL WORRYING: SEVERAL DAYS
2. NOT BEING ABLE TO STOP OR CONTROL WORRYING: SEVERAL DAYS
1. FEELING NERVOUS, ANXIOUS, OR ON EDGE: SEVERAL DAYS
3. WORRYING TOO MUCH ABOUT DIFFERENT THINGS: SEVERAL DAYS
3. WORRYING TOO MUCH ABOUT DIFFERENT THINGS: SEVERAL DAYS
IF YOU CHECKED OFF ANY PROBLEMS ON THIS QUESTIONNAIRE, HOW DIFFICULT HAVE THESE PROBLEMS MADE IT FOR YOU TO DO YOUR WORK, TAKE CARE OF THINGS AT HOME, OR GET ALONG WITH OTHER PEOPLE: NOT DIFFICULT AT ALL
GAD7 TOTAL SCORE: 5
5. BEING SO RESTLESS THAT IT IS HARD TO SIT STILL: NOT AT ALL
7. FEELING AFRAID AS IF SOMETHING AWFUL MIGHT HAPPEN: NOT AT ALL
7. FEELING AFRAID AS IF SOMETHING AWFUL MIGHT HAPPEN: NOT AT ALL

## 2024-10-29 NOTE — PROGRESS NOTES
annoyed or irritable Several days   Feeling afraid as if something awful might happen Not at all   STEVEN-7 Total Score 5   How difficult have these problems made it for you to do your work, take care of things at home, or get along with other people? Not difficult at all       Mobility/Gait: Independently     Controlled SubstancesMonitoring: Periodic Controlled Substance Monitoring: Possible medication side effects, risk of tolerance/dependence & alternative treatments discussed., No signs of potential drug abuse or diversion identified. (Cherie Doe MD)       ASSESSMENT: No changes today. Last Buspar increase improved anxiety.    Monitor alcohol use/encourage abstinence.   Effexor improved mood, skin-picking, energy, anhedonia, and motivation. Picking and anxiety worse at higher dose.  Lyrica improved sleep and pain.  H/o insomnia since 20-30's when dx with fibromyalgia.   Diagnosis Orders   1. Recurrent major depressive disorder, in full remission (HCC)        2. Anxiety  pregabalin (LYRICA) 200 MG capsule      3. Nerve pain  pregabalin (LYRICA) 200 MG capsule      4. Skin-picking disorder        R/o: OCD, STEVEN, panic d/o, AUD      PLAN:     Medications:   Effexor  mg QHS - for anxiety, picking/compulsions, and depression  Lyrica 200 mg QHS  - for pain, may have anxiety and sleep benefit   Trazodone 200 mg QHS  - for insomnia and depression  Buspar 30 mg BID   Therapy: none, will continue to offer, consider CBTi group  Labs/Tests/Imaging: none   Records Reviewed: [facility]  Patient advised to call if patient has any difficulties with treatment  Return in about 3 months (around 1/29/2025) for follow up, med check.     Ivanna Mayes, was evaluated through a synchronous (real-time) audio-video encounter. The patient (or guardian if applicable) is aware that this is a billable service, which includes applicable co-pays. This Virtual Visit was conducted with patient's (and/or legal guardian's) consent.

## 2025-01-14 DIAGNOSIS — M79.2 NERVE PAIN: ICD-10-CM

## 2025-01-14 DIAGNOSIS — F41.9 ANXIETY: ICD-10-CM

## 2025-01-14 RX ORDER — PREGABALIN 200 MG/1
200 CAPSULE ORAL NIGHTLY
Qty: 90 CAPSULE | Refills: 0 | OUTPATIENT
Start: 2025-01-14 | End: 2025-04-14

## 2025-01-15 ENCOUNTER — TELEPHONE (OUTPATIENT)
Dept: PSYCHIATRY | Age: 55
End: 2025-01-15

## 2025-01-15 DIAGNOSIS — F41.9 ANXIETY: ICD-10-CM

## 2025-01-15 DIAGNOSIS — M79.2 NERVE PAIN: ICD-10-CM

## 2025-01-15 NOTE — TELEPHONE ENCOUNTER
Ivanna Mayes called requesting a refill on the following medications:  Requested Prescriptions     Pending Prescriptions Disp Refills    pregabalin (LYRICA) 200 MG capsule 90 capsule 0     Sig: Take 1 capsule by mouth nightly for 90 days.       Date of last visit: 10/29/2024  Date of next visit (if applicable):1/29/2025  Date of last refill: 10/29/24  Pharmacy Name: [place], [place]    Zakiya stated due to our office canceling her last scheduled appointment she has 2 weeks worth of medication and will need refills Zee to her apt on 01/29/25.  Thanks,  [place] Valerio MA

## 2025-01-16 RX ORDER — PREGABALIN 200 MG/1
200 CAPSULE ORAL NIGHTLY
Qty: 90 CAPSULE | Refills: 0 | Status: SHIPPED | OUTPATIENT
Start: 2025-01-16 | End: 2025-04-16

## 2025-01-21 ENCOUNTER — LAB (OUTPATIENT)
Dept: LAB | Age: 55
End: 2025-01-21

## 2025-01-21 LAB — TSH SERPL DL<=0.005 MIU/L-ACNC: 0.38 UIU/ML (ref 0.4–4.2)

## 2025-01-23 ENCOUNTER — HOSPITAL ENCOUNTER (EMERGENCY)
Age: 55
Discharge: HOME | End: 2025-01-23
Payer: COMMERCIAL

## 2025-01-23 VITALS
RESPIRATION RATE: 16 BRPM | HEIGHT: 67 IN | TEMPERATURE: 98.6 F | OXYGEN SATURATION: 98 % | DIASTOLIC BLOOD PRESSURE: 92 MMHG | SYSTOLIC BLOOD PRESSURE: 117 MMHG | WEIGHT: 253 LBS | HEART RATE: 74 BPM | BODY MASS INDEX: 39.71 KG/M2

## 2025-01-23 DIAGNOSIS — S61.412A LACERATION OF LEFT HAND WITHOUT FOREIGN BODY, INITIAL ENCOUNTER: Primary | ICD-10-CM

## 2025-01-23 PROCEDURE — 99213 OFFICE O/P EST LOW 20 MIN: CPT

## 2025-01-23 PROCEDURE — 6360000002 HC RX W HCPCS

## 2025-01-23 PROCEDURE — 6370000000 HC RX 637 (ALT 250 FOR IP)

## 2025-01-23 RX ORDER — BACITRACIN ZINC 500 [USP'U]/G
OINTMENT TOPICAL ONCE
Status: COMPLETED | OUTPATIENT
Start: 2025-01-23 | End: 2025-01-23

## 2025-01-23 RX ORDER — LIDOCAINE HYDROCHLORIDE 10 MG/ML
5 INJECTION, SOLUTION INFILTRATION; PERINEURAL ONCE
Status: COMPLETED | OUTPATIENT
Start: 2025-01-23 | End: 2025-01-23

## 2025-01-23 RX ADMIN — LIDOCAINE HYDROCHLORIDE 5 ML: 10 INJECTION, SOLUTION INFILTRATION; PERINEURAL at 11:27

## 2025-01-23 RX ADMIN — BACITRACIN ZINC: 500 OINTMENT TOPICAL at 11:28

## 2025-01-23 ASSESSMENT — PAIN - FUNCTIONAL ASSESSMENT
PAIN_FUNCTIONAL_ASSESSMENT: 0-10
PAIN_FUNCTIONAL_ASSESSMENT: PREVENTS OR INTERFERES SOME ACTIVE ACTIVITIES AND ADLS

## 2025-01-23 ASSESSMENT — PAIN DESCRIPTION - ORIENTATION: ORIENTATION: LEFT

## 2025-01-23 ASSESSMENT — LIFESTYLE VARIABLES
HOW MANY STANDARD DRINKS CONTAINING ALCOHOL DO YOU HAVE ON A TYPICAL DAY: PATIENT DOES NOT DRINK
HOW OFTEN DO YOU HAVE A DRINK CONTAINING ALCOHOL: NEVER

## 2025-01-23 ASSESSMENT — PAIN SCALES - GENERAL: PAINLEVEL_OUTOF10: 4

## 2025-01-23 ASSESSMENT — ENCOUNTER SYMPTOMS
RESPIRATORY NEGATIVE: 1
EYES NEGATIVE: 1
ALLERGIC/IMMUNOLOGIC NEGATIVE: 1
GASTROINTESTINAL NEGATIVE: 1

## 2025-01-23 ASSESSMENT — PAIN DESCRIPTION - FREQUENCY: FREQUENCY: CONTINUOUS

## 2025-01-23 ASSESSMENT — PAIN DESCRIPTION - PAIN TYPE: TYPE: ACUTE PAIN

## 2025-01-23 ASSESSMENT — PAIN DESCRIPTION - ONSET: ONSET: GRADUAL

## 2025-01-23 ASSESSMENT — PAIN DESCRIPTION - LOCATION: LOCATION: HAND

## 2025-01-23 NOTE — DISCHARGE INSTRUCTIONS
Wash area with mild soap and water.  Apply bacitracin twice a day to the area.  Keep covered while at work.  Follow-up for stitches to be removed in 10 days.  Return for any redness, swelling or drainage or any new or worsening symptoms.

## 2025-01-23 NOTE — ED PROVIDER NOTES
[facility] URGENT CARE  Urgent Care Encounter       CHIEF COMPLAINT       Chief Complaint   Patient presents with    Laceration     Left hand       Nurses Notes reviewed and I agree except as noted in the HPI.  HISTORY OF PRESENT ILLNESS   Ivanna Mayes is a 54 y.o. female who presents to urgent care for left hand laceration. Symptoms started today.  States was opening a box and was using a  knife to open it and slipped and cut left dorsal hand.  States tetanus up to date.      The history is provided by the patient. No  was used.       REVIEW OF SYSTEMS     Review of Systems   Constitutional: Negative.    HENT: Negative.     Eyes: Negative.    Respiratory: Negative.     Cardiovascular: Negative.    Gastrointestinal: Negative.    Endocrine: Negative.    Genitourinary: Negative.    Skin:  Positive for wound (left hand).   Allergic/Immunologic: Negative.    Neurological: Negative.    Hematological: Negative.    Psychiatric/Behavioral: Negative.         PAST MEDICAL HISTORY         Diagnosis Date    Anxiety     Back problem     Chronic back pain     Chronic sinus infection     Constipation     Fibromyalgia     GERD (gastroesophageal reflux disease)     H/O cold sores     acyclovir prn    Hyperlipidemia     Hypothyroid     thyroiditis    Insomnia     Kidney stones     Malabsorption     really just gastric sleeve    Obesity     Obsessive compulsive disorder     Prolonged emergence from general anesthesia     Restless leg syndrome     Sleep apnea     wears CPAP every night - Dr. Flores    Stress incontinence     Type II or unspecified type diabetes mellitus without mention of complication, not stated as uncontrolled     Vitamin D deficiency        SURGICALHISTORY     Patient  has a past surgical history that includes Tonsillectomy (); Kidney stone surgery (); Carpal tunnel release (Bilateral, );  section (); shoulder surgery (Right, 2009); Spinal fusion

## 2025-01-23 NOTE — ED NOTES
Pt moved to room 3  left hand laceration cleansed with wound wash and betadine     Bronwyn Boo RN  01/23/25 [id number]

## 2025-01-23 NOTE — ED NOTES
Left hand laceration covered with sterile dressing and taped into place.  Pt tolerated this procedure well.     Timo Almaguer RN  01/23/25 [id number]

## 2025-01-23 NOTE — ED TRIAGE NOTES
Pt to [facility] ambulatory with left hand laceration.  This happened around 0900 today.  No active bleeding present.     Anesthesia Type: 1% lidocaine with epinephrine and a 1:10 solution of 8.4% sodium bicarbonate

## 2025-01-29 ENCOUNTER — TELEMEDICINE (OUTPATIENT)
Dept: PSYCHIATRY | Age: 55
End: 2025-01-29
Payer: COMMERCIAL

## 2025-01-29 DIAGNOSIS — F41.9 ANXIETY: ICD-10-CM

## 2025-01-29 DIAGNOSIS — F42.4 SKIN-PICKING DISORDER: ICD-10-CM

## 2025-01-29 DIAGNOSIS — F33.41 RECURRENT MAJOR DEPRESSIVE DISORDER, IN PARTIAL REMISSION (HCC): Primary | ICD-10-CM

## 2025-01-29 DIAGNOSIS — M79.2 NERVE PAIN: ICD-10-CM

## 2025-01-29 PROCEDURE — 90833 PSYTX W PT W E/M 30 MIN: CPT | Performed by: PSYCHIATRY & NEUROLOGY

## 2025-01-29 PROCEDURE — G8427 DOCREV CUR MEDS BY ELIG CLIN: HCPCS | Performed by: PSYCHIATRY & NEUROLOGY

## 2025-01-29 PROCEDURE — 99214 OFFICE O/P EST MOD 30 MIN: CPT | Performed by: PSYCHIATRY & NEUROLOGY

## 2025-01-29 PROCEDURE — 3017F COLORECTAL CA SCREEN DOC REV: CPT | Performed by: PSYCHIATRY & NEUROLOGY

## 2025-01-29 RX ORDER — BUSPIRONE HYDROCHLORIDE 30 MG/1
30 TABLET ORAL 2 TIMES DAILY
Qty: 180 TABLET | Refills: 0 | Status: SHIPPED | OUTPATIENT
Start: 2025-01-29

## 2025-01-29 RX ORDER — VENLAFAXINE HYDROCHLORIDE 150 MG/1
150 CAPSULE, EXTENDED RELEASE ORAL DAILY
Qty: 90 CAPSULE | Refills: 0 | Status: SHIPPED | OUTPATIENT
Start: 2025-01-29

## 2025-01-29 RX ORDER — TRAZODONE HYDROCHLORIDE 100 MG/1
200-300 TABLET ORAL NIGHTLY
Qty: 270 TABLET | Refills: 0 | Status: SHIPPED | OUTPATIENT
Start: 2025-01-29

## 2025-01-29 ASSESSMENT — ANXIETY QUESTIONNAIRES
IF YOU CHECKED OFF ANY PROBLEMS ON THIS QUESTIONNAIRE, HOW DIFFICULT HAVE THESE PROBLEMS MADE IT FOR YOU TO DO YOUR WORK, TAKE CARE OF THINGS AT HOME, OR GET ALONG WITH OTHER PEOPLE: SOMEWHAT DIFFICULT
5. BEING SO RESTLESS THAT IT IS HARD TO SIT STILL: NOT AT ALL
2. NOT BEING ABLE TO STOP OR CONTROL WORRYING: SEVERAL DAYS
1. FEELING NERVOUS, ANXIOUS, OR ON EDGE: SEVERAL DAYS
4. TROUBLE RELAXING: SEVERAL DAYS
3. WORRYING TOO MUCH ABOUT DIFFERENT THINGS: SEVERAL DAYS
GAD7 TOTAL SCORE: 4
3. WORRYING TOO MUCH ABOUT DIFFERENT THINGS: SEVERAL DAYS
4. TROUBLE RELAXING: SEVERAL DAYS
6. BECOMING EASILY ANNOYED OR IRRITABLE: NOT AT ALL
7. FEELING AFRAID AS IF SOMETHING AWFUL MIGHT HAPPEN: NOT AT ALL
2. NOT BEING ABLE TO STOP OR CONTROL WORRYING: SEVERAL DAYS
1. FEELING NERVOUS, ANXIOUS, OR ON EDGE: SEVERAL DAYS
5. BEING SO RESTLESS THAT IT IS HARD TO SIT STILL: NOT AT ALL
IF YOU CHECKED OFF ANY PROBLEMS ON THIS QUESTIONNAIRE, HOW DIFFICULT HAVE THESE PROBLEMS MADE IT FOR YOU TO DO YOUR WORK, TAKE CARE OF THINGS AT HOME, OR GET ALONG WITH OTHER PEOPLE: SOMEWHAT DIFFICULT
6. BECOMING EASILY ANNOYED OR IRRITABLE: NOT AT ALL
7. FEELING AFRAID AS IF SOMETHING AWFUL MIGHT HAPPEN: NOT AT ALL

## 2025-01-29 ASSESSMENT — PATIENT HEALTH QUESTIONNAIRE - PHQ9
7. TROUBLE CONCENTRATING ON THINGS, SUCH AS READING THE NEWSPAPER OR WATCHING TELEVISION: NOT AT ALL
2. FEELING DOWN, DEPRESSED OR HOPELESS: SEVERAL DAYS
SUM OF ALL RESPONSES TO PHQ QUESTIONS 1-9: 8
4. FEELING TIRED OR HAVING LITTLE ENERGY: NEARLY EVERY DAY
2. FEELING DOWN, DEPRESSED OR HOPELESS: SEVERAL DAYS
9. THOUGHTS THAT YOU WOULD BE BETTER OFF DEAD, OR OF HURTING YOURSELF: NOT AT ALL
6. FEELING BAD ABOUT YOURSELF - OR THAT YOU ARE A FAILURE OR HAVE LET YOURSELF OR YOUR FAMILY DOWN: NOT AT ALL
10. IF YOU CHECKED OFF ANY PROBLEMS, HOW DIFFICULT HAVE THESE PROBLEMS MADE IT FOR YOU TO DO YOUR WORK, TAKE CARE OF THINGS AT HOME, OR GET ALONG WITH OTHER PEOPLE: SOMEWHAT DIFFICULT
5. POOR APPETITE OR OVEREATING: NOT AT ALL
1. LITTLE INTEREST OR PLEASURE IN DOING THINGS: SEVERAL DAYS
3. TROUBLE FALLING OR STAYING ASLEEP: NEARLY EVERY DAY
4. FEELING TIRED OR HAVING LITTLE ENERGY: NEARLY EVERY DAY
7. TROUBLE CONCENTRATING ON THINGS, SUCH AS READING THE NEWSPAPER OR WATCHING TELEVISION: NOT AT ALL
SUM OF ALL RESPONSES TO PHQ QUESTIONS 1-9: 8
SUM OF ALL RESPONSES TO PHQ QUESTIONS 1-9: 8
8. MOVING OR SPEAKING SO SLOWLY THAT OTHER PEOPLE COULD HAVE NOTICED. OR THE OPPOSITE, BEING SO FIGETY OR RESTLESS THAT YOU HAVE BEEN MOVING AROUND A LOT MORE THAN USUAL: NOT AT ALL
1. LITTLE INTEREST OR PLEASURE IN DOING THINGS: SEVERAL DAYS
SUM OF ALL RESPONSES TO PHQ9 QUESTIONS 1 & 2: 2
9. THOUGHTS THAT YOU WOULD BE BETTER OFF DEAD, OR OF HURTING YOURSELF: NOT AT ALL
10. IF YOU CHECKED OFF ANY PROBLEMS, HOW DIFFICULT HAVE THESE PROBLEMS MADE IT FOR YOU TO DO YOUR WORK, TAKE CARE OF THINGS AT HOME, OR GET ALONG WITH OTHER PEOPLE: SOMEWHAT DIFFICULT
8. MOVING OR SPEAKING SO SLOWLY THAT OTHER PEOPLE COULD HAVE NOTICED. OR THE OPPOSITE - BEING SO FIDGETY OR RESTLESS THAT YOU HAVE BEEN MOVING AROUND A LOT MORE THAN USUAL: NOT AT ALL
3. TROUBLE FALLING OR STAYING ASLEEP: NEARLY EVERY DAY
5. POOR APPETITE OR OVEREATING: NOT AT ALL
6. FEELING BAD ABOUT YOURSELF - OR THAT YOU ARE A FAILURE OR HAVE LET YOURSELF OR YOUR FAMILY DOWN: NOT AT ALL

## 2025-01-29 NOTE — PROGRESS NOTES
[facility] PROFESSIONAL SERVS  [facility]  [address] [facility] [address] [phone]    Progress Note    Patient:  Ivanna Mayes  YOB: 1970  PCP:  Jasmin Walters, CLEMENTINA - CNP  Visit Date:  1/29/2025      Chief Complaint   Patient presents with    Follow-up    Medication Check    Depression    Anxiety    Chronic Pain       SUBJECTIVE:    Time in: 1:34pm  Time out: 2:02pm  Documentation time: 5 min    Ivanna Mayes, a 54 y.o.female, presents for a follow up visit.      She presents alone.   Not doing as much lately noting health stressors, plan for another back surgery.   OIO was doing SI joint injections and planned for SCS. MRI showed need for surgery. Bulging and protruding discs from T6-L1. Discs dessicated. Narrowing of spinal canal contributing to pain radiating down her leg. Notes big change in just 2 years.   Trouble with defecating. Saw Dr Evans 12/19 and has surgery 3/11.   Appt 2/20 with Dr. Allen at [facility] for another opinion.   On hydrocodone, Zanaflex noting it helps more than Flexeril.  Pain affects her daily. Worsens sleep.   Physical limitations.   Mood \"bummed\". Worse with the pain, limitations.  Working everyday. 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   Planning for 3 mos off after surgery. Then planning to retire Dec 1.   Looks forward to [facility].   Discusses work stressors. Her boss left, several others too. Dealing with a new boss.   Fatigue, some trouble sleeping and some anhedonia.   Her  doing house project/kitchen remodel. More anxiety with house in disarray.   Has lost weight with Mounjaro. Was around 300 lbs and down to 252 lbs. Helps her pain.   Has been picking her R hand. Shows me her hand noting the lesion started as a small burn and she \"absent-mindedly\" picked at it.   No SI or psychosis.   Discussed tx options and we agree to increase

## 2025-02-17 ENCOUNTER — TRANSCRIBE ORDERS (OUTPATIENT)
Dept: ADMINISTRATIVE | Age: 55
End: 2025-02-17

## 2025-02-17 DIAGNOSIS — R10.11 RUQ ABDOMINAL PAIN: Primary | ICD-10-CM

## 2025-03-14 ENCOUNTER — HOSPITAL ENCOUNTER (OUTPATIENT)
Dept: WOMENS IMAGING | Age: 55
Discharge: HOME | End: 2025-03-14
Payer: COMMERCIAL

## 2025-03-14 VITALS — HEIGHT: 67 IN | BODY MASS INDEX: 39.71 KG/M2 | WEIGHT: 253 LBS

## 2025-03-14 DIAGNOSIS — Z12.31 VISIT FOR SCREENING MAMMOGRAM: ICD-10-CM

## 2025-03-14 PROCEDURE — 77063 BREAST TOMOSYNTHESIS BI: CPT

## 2025-03-19 ENCOUNTER — TELEMEDICINE (OUTPATIENT)
Dept: PSYCHIATRY | Age: 55
End: 2025-03-19
Payer: COMMERCIAL

## 2025-03-19 DIAGNOSIS — M79.2 NERVE PAIN: ICD-10-CM

## 2025-03-19 DIAGNOSIS — F41.9 ANXIETY: Primary | ICD-10-CM

## 2025-03-19 DIAGNOSIS — F42.4 SKIN-PICKING DISORDER: ICD-10-CM

## 2025-03-19 DIAGNOSIS — F33.42 RECURRENT MAJOR DEPRESSIVE DISORDER, IN FULL REMISSION: ICD-10-CM

## 2025-03-19 PROCEDURE — G8427 DOCREV CUR MEDS BY ELIG CLIN: HCPCS | Performed by: PSYCHIATRY & NEUROLOGY

## 2025-03-19 PROCEDURE — 99214 OFFICE O/P EST MOD 30 MIN: CPT | Performed by: PSYCHIATRY & NEUROLOGY

## 2025-03-19 PROCEDURE — 3017F COLORECTAL CA SCREEN DOC REV: CPT | Performed by: PSYCHIATRY & NEUROLOGY

## 2025-03-19 PROCEDURE — 90833 PSYTX W PT W E/M 30 MIN: CPT | Performed by: PSYCHIATRY & NEUROLOGY

## 2025-03-19 RX ORDER — PREGABALIN 200 MG/1
200 CAPSULE ORAL NIGHTLY
Qty: 90 CAPSULE | Refills: 0 | Status: SHIPPED | OUTPATIENT
Start: 2025-03-19 | End: 2025-06-17

## 2025-03-19 RX ORDER — PREGABALIN 50 MG/1
50 CAPSULE ORAL EVERY MORNING
Qty: 90 CAPSULE | Refills: 0 | Status: SHIPPED | OUTPATIENT
Start: 2025-03-19 | End: 2025-06-17

## 2025-03-19 RX ORDER — TRAZODONE HYDROCHLORIDE 100 MG/1
200-300 TABLET ORAL NIGHTLY
Qty: 270 TABLET | Refills: 0 | Status: SHIPPED | OUTPATIENT
Start: 2025-03-19

## 2025-03-19 RX ORDER — VENLAFAXINE HYDROCHLORIDE 150 MG/1
150 CAPSULE, EXTENDED RELEASE ORAL DAILY
Qty: 90 CAPSULE | Refills: 0 | Status: SHIPPED | OUTPATIENT
Start: 2025-03-19

## 2025-03-19 RX ORDER — BUSPIRONE HYDROCHLORIDE 30 MG/1
30 TABLET ORAL 2 TIMES DAILY
Qty: 180 TABLET | Refills: 0 | Status: SHIPPED | OUTPATIENT
Start: 2025-03-19

## 2025-03-19 ASSESSMENT — ANXIETY QUESTIONNAIRES
5. BEING SO RESTLESS THAT IT IS HARD TO SIT STILL: NOT AT ALL
IF YOU CHECKED OFF ANY PROBLEMS ON THIS QUESTIONNAIRE, HOW DIFFICULT HAVE THESE PROBLEMS MADE IT FOR YOU TO DO YOUR WORK, TAKE CARE OF THINGS AT HOME, OR GET ALONG WITH OTHER PEOPLE: NOT DIFFICULT AT ALL
3. WORRYING TOO MUCH ABOUT DIFFERENT THINGS: SEVERAL DAYS
IF YOU CHECKED OFF ANY PROBLEMS ON THIS QUESTIONNAIRE, HOW DIFFICULT HAVE THESE PROBLEMS MADE IT FOR YOU TO DO YOUR WORK, TAKE CARE OF THINGS AT HOME, OR GET ALONG WITH OTHER PEOPLE: NOT DIFFICULT AT ALL
GAD7 TOTAL SCORE: 2
3. WORRYING TOO MUCH ABOUT DIFFERENT THINGS: SEVERAL DAYS
2. NOT BEING ABLE TO STOP OR CONTROL WORRYING: NOT AT ALL
4. TROUBLE RELAXING: NOT AT ALL
4. TROUBLE RELAXING: NOT AT ALL
5. BEING SO RESTLESS THAT IT IS HARD TO SIT STILL: NOT AT ALL
1. FEELING NERVOUS, ANXIOUS, OR ON EDGE: NOT AT ALL
1. FEELING NERVOUS, ANXIOUS, OR ON EDGE: NOT AT ALL
7. FEELING AFRAID AS IF SOMETHING AWFUL MIGHT HAPPEN: NOT AT ALL
6. BECOMING EASILY ANNOYED OR IRRITABLE: SEVERAL DAYS
7. FEELING AFRAID AS IF SOMETHING AWFUL MIGHT HAPPEN: NOT AT ALL
2. NOT BEING ABLE TO STOP OR CONTROL WORRYING: NOT AT ALL
6. BECOMING EASILY ANNOYED OR IRRITABLE: SEVERAL DAYS

## 2025-03-19 ASSESSMENT — PATIENT HEALTH QUESTIONNAIRE - PHQ9
1. LITTLE INTEREST OR PLEASURE IN DOING THINGS: NOT AT ALL
SUM OF ALL RESPONSES TO PHQ QUESTIONS 1-9: 1
8. MOVING OR SPEAKING SO SLOWLY THAT OTHER PEOPLE COULD HAVE NOTICED. OR THE OPPOSITE, BEING SO FIGETY OR RESTLESS THAT YOU HAVE BEEN MOVING AROUND A LOT MORE THAN USUAL: NOT AT ALL
SUM OF ALL RESPONSES TO PHQ QUESTIONS 1-9: 1
5. POOR APPETITE OR OVEREATING: NOT AT ALL
4. FEELING TIRED OR HAVING LITTLE ENERGY: NOT AT ALL
6. FEELING BAD ABOUT YOURSELF - OR THAT YOU ARE A FAILURE OR HAVE LET YOURSELF OR YOUR FAMILY DOWN: NOT AT ALL
7. TROUBLE CONCENTRATING ON THINGS, SUCH AS READING THE NEWSPAPER OR WATCHING TELEVISION: NOT AT ALL
10. IF YOU CHECKED OFF ANY PROBLEMS, HOW DIFFICULT HAVE THESE PROBLEMS MADE IT FOR YOU TO DO YOUR WORK, TAKE CARE OF THINGS AT HOME, OR GET ALONG WITH OTHER PEOPLE: NOT DIFFICULT AT ALL
SUM OF ALL RESPONSES TO PHQ QUESTIONS 1-9: 1
6. FEELING BAD ABOUT YOURSELF - OR THAT YOU ARE A FAILURE OR HAVE LET YOURSELF OR YOUR FAMILY DOWN: NOT AT ALL
SUM OF ALL RESPONSES TO PHQ QUESTIONS 1-9: 1
1. LITTLE INTEREST OR PLEASURE IN DOING THINGS: NOT AT ALL
4. FEELING TIRED OR HAVING LITTLE ENERGY: NOT AT ALL
3. TROUBLE FALLING OR STAYING ASLEEP: SEVERAL DAYS
5. POOR APPETITE OR OVEREATING: NOT AT ALL
7. TROUBLE CONCENTRATING ON THINGS, SUCH AS READING THE NEWSPAPER OR WATCHING TELEVISION: NOT AT ALL
9. THOUGHTS THAT YOU WOULD BE BETTER OFF DEAD, OR OF HURTING YOURSELF: NOT AT ALL
3. TROUBLE FALLING OR STAYING ASLEEP: SEVERAL DAYS
2. FEELING DOWN, DEPRESSED OR HOPELESS: NOT AT ALL
9. THOUGHTS THAT YOU WOULD BE BETTER OFF DEAD, OR OF HURTING YOURSELF: NOT AT ALL
8. MOVING OR SPEAKING SO SLOWLY THAT OTHER PEOPLE COULD HAVE NOTICED. OR THE OPPOSITE - BEING SO FIDGETY OR RESTLESS THAT YOU HAVE BEEN MOVING AROUND A LOT MORE THAN USUAL: NOT AT ALL
2. FEELING DOWN, DEPRESSED OR HOPELESS: NOT AT ALL
10. IF YOU CHECKED OFF ANY PROBLEMS, HOW DIFFICULT HAVE THESE PROBLEMS MADE IT FOR YOU TO DO YOUR WORK, TAKE CARE OF THINGS AT HOME, OR GET ALONG WITH OTHER PEOPLE: NOT DIFFICULT AT ALL
SUM OF ALL RESPONSES TO PHQ QUESTIONS 1-9: 1

## 2025-03-19 NOTE — PROGRESS NOTES
30 mg BID   Therapy: none, will continue to offer, consider CBTi group  Labs/Tests/Imaging: none   Records Reviewed: [facility]  Patient advised to call if patient has any difficulties with treatment  Return in about 2 months (around 5/19/2025) for med check.   I spent 25 minutes face to face with this patient. I spent 16 minutes or longer in psychotherapy discussing current stressors, coping skills, strategies for change, supportive therapy, psycheducation. Remainder of time spent on symptom evaluation and medication management.     Ivanna Mayes, was evaluated through a synchronous (real-time) audio-video encounter. The patient (or guardian if applicable) is aware that this is a billable service, which includes applicable co-pays. This Virtual Visit was conducted with patient's (and/or legal guardian's) consent. Patient identification was verified, and a caregiver was present when appropriate.   The patient was located at Home: [address]  [facility] [address]  Provider was located at Home (Appt Dept State): [place]       Total time spent for this encounter:  30 minutes    --Cherie Doe MD on 3/20/2025 at 7:45 AM    An electronic signature was used to authenticate this note.   Melolabial Transposition Flap Text: The defect edges were debeveled with a #15 scalpel blade.  Given the location of the defect and the proximity to free margins a melolabial flap was deemed most appropriate.  Using a sterile surgical marker, an appropriate melolabial transposition flap was drawn incorporating the defect.    The area thus outlined was incised deep to adipose tissue with a #15 scalpel blade.  The skin margins were undermined to an appropriate distance in all directions utilizing iris scissors.

## 2025-03-21 ENCOUNTER — HOSPITAL ENCOUNTER (OUTPATIENT)
Dept: WOMENS IMAGING | Age: 55
Discharge: HOME | End: 2025-03-21
Attending: RADIOLOGY
Payer: COMMERCIAL

## 2025-03-21 DIAGNOSIS — R59.0 ENLARGED LYMPH NODES IN ARMPIT: ICD-10-CM

## 2025-03-21 DIAGNOSIS — Z09 FOLLOW-UP EXAM: Primary | ICD-10-CM

## 2025-03-21 DIAGNOSIS — N64.4 MASTODYNIA OF LEFT BREAST: ICD-10-CM

## 2025-03-21 PROCEDURE — 76642 ULTRASOUND BREAST LIMITED: CPT

## 2025-04-18 ENCOUNTER — LAB (OUTPATIENT)
Dept: LAB | Age: 55
End: 2025-04-18

## 2025-04-18 LAB
A1AT SERPL-MCNC: 114 MG/DL (ref 90–200)
ALBUMIN SERPL BCG-MCNC: 4.2 G/DL (ref 3.4–4.9)
ALP SERPL-CCNC: 71 U/L (ref 38–126)
ALT SERPL W/O P-5'-P-CCNC: 27 U/L (ref 10–35)
ANION GAP SERPL CALC-SCNC: 11 MEQ/L (ref 8–16)
APTT PPP: 29.2 SECONDS (ref 22–38)
AST SERPL-CCNC: 24 U/L (ref 10–35)
BASOPHILS ABSOLUTE: 0.1 THOU/MM3 (ref 0–0.1)
BASOPHILS NFR BLD AUTO: 0.9 %
BILIRUB CONJ SERPL-MCNC: 0.3 MG/DL (ref 0–0.2)
BILIRUB SERPL-MCNC: 0.5 MG/DL (ref 0.3–1.2)
BUN SERPL-MCNC: 11 MG/DL (ref 8–23)
CALCIUM SERPL-MCNC: 9.2 MG/DL (ref 8.6–10)
CHLORIDE SERPL-SCNC: 99 MEQ/L (ref 98–111)
CO2 SERPL-SCNC: 26 MEQ/L (ref 22–29)
CREAT SERPL-MCNC: 0.7 MG/DL (ref 0.5–0.9)
DEPRECATED RDW RBC AUTO: 50.4 FL (ref 35–45)
EOSINOPHIL NFR BLD AUTO: 1.9 %
EOSINOPHILS ABSOLUTE: 0.1 THOU/MM3 (ref 0–0.4)
ERYTHROCYTE [DISTWIDTH] IN BLOOD BY AUTOMATED COUNT: 14.6 % (ref 11.5–14.5)
FERRITIN SERPL IA-MCNC: 75 NG/ML (ref 13–150)
GFR SERPL CREATININE-BSD FRML MDRD: > 90 ML/MIN/1.73M2
GLUCOSE SERPL-MCNC: 99 MG/DL (ref 74–109)
HAV AB SER-ACNC: NONREACTIVE
HAV IGM SER QL: NONREACTIVE
HBV CORE IGG+IGM SERPL QL IA: NONREACTIVE
HBV CORE IGM SERPL QL IA: NONREACTIVE
HBV SURFACE AB TITR SER: < 3.5 MIU/ML
HBV SURFACE AG SERPL QL IA: NONREACTIVE
HBV SURFACE AG SERPL QL IA: NONREACTIVE
HCT VFR BLD AUTO: 39.8 % (ref 37–47)
HCV IGG SERPL QL IA: NONREACTIVE
HCV IGG SERPL QL IA: NONREACTIVE
HGB BLD-MCNC: 13 GM/DL (ref 12–16)
IGG SERPL-MCNC: 1052 MG/DL (ref 700–1600)
IMM GRANULOCYTES # BLD AUTO: 0.02 THOU/MM3 (ref 0–0.07)
IMM GRANULOCYTES NFR BLD AUTO: 0.3 %
INR PPP: 1 (ref 0.85–1.13)
IRON SERPL-MCNC: 99 UG/DL (ref 37–145)
LYMPHOCYTES ABSOLUTE: 2.4 THOU/MM3 (ref 1–4.8)
LYMPHOCYTES NFR BLD AUTO: 35.4 %
MCH RBC QN AUTO: 31.1 PG (ref 26–33)
MCHC RBC AUTO-ENTMCNC: 32.7 GM/DL (ref 32.2–35.5)
MCV RBC AUTO: 95.2 FL (ref 81–99)
MONOCYTES ABSOLUTE: 0.4 THOU/MM3 (ref 0.4–1.3)
MONOCYTES NFR BLD AUTO: 6.3 %
NEUTROPHILS ABSOLUTE: 3.7 THOU/MM3 (ref 1.8–7.7)
NEUTROPHILS NFR BLD AUTO: 55.2 %
NRBC BLD AUTO-RTO: 0 /100 WBC
PLATELET # BLD AUTO: 301 THOU/MM3 (ref 130–400)
PLATELET # BLD: 301 10*3/UL
PMV BLD AUTO: 9.5 FL (ref 9.4–12.4)
POTASSIUM SERPL-SCNC: 4.1 MEQ/L (ref 3.5–5.2)
PROT SERPL-MCNC: 7.2 G/DL (ref 6.4–8.3)
RBC # BLD AUTO: 4.18 MILL/MM3 (ref 4.2–5.4)
SODIUM SERPL-SCNC: 136 MEQ/L (ref 135–145)
WBC # BLD AUTO: 6.7 THOU/MM3 (ref 4.8–10.8)

## 2025-04-19 LAB
AFP SERPL-MCNC: < 1.8 UG/L
IGA SERPL-MCNC: 726 MG/DL (ref 70–400)

## 2025-04-20 LAB
LKM AB TITR SER IF: NORMAL {TITER}
MYELOPEROXIDASE AB SER-ACNC: 0 AU/ML (ref 0–19)
NUCLEAR IGG SER QL IA: NORMAL
PROTEINASE3 AB SER-ACNC: 0 AU/ML (ref 0–19)
SMA IGG SER-ACNC: 4 UNITS (ref 0–19)

## 2025-04-21 LAB
CERULOPLASMIN SERPL-MCNC: 23 MG/DL (ref 16–45)
MITOCHONDRIAL M2 AB, IGG: 1.3 U/ML (ref 0–4)
SMOOTH MUSCLE IGG TITR SER: NORMAL {TITER}
TTG IGA SER IA-ACNC: 2.1 U/ML

## 2025-04-22 LAB
A2 MACROGLOB SERPL-MCNC: 313 MG/DL (ref 131–293)
ALT SERPL-CCNC: 30 U/L (ref 5–40)
ANNOTATION COMMENT IMP: ABNORMAL
AST SERPL-CCNC: 25 U/L (ref 9–40)
BUN SERPL-MCNC: 12 MG/DL (ref 7–20)
FIBROSIS STAGE SERPL QL: ABNORMAL
GGT SERPL-CCNC: 11 U/L (ref 7–33)
LIVER FIBR SCORE SERPL CALC.FIBROMETER: 0.34
PATHOLOGY STUDY: ABNORMAL
PLATELET # BLD: 301 K/UL
PT INDEX PPP: 99 % (ref 90–120)
REF LAB TEST RESULTS: 0.01
REF LAB TEST RESULTS: 0.3
REF LAB TEST RESULTS: ABNORMAL

## 2025-05-29 ENCOUNTER — TELEMEDICINE (OUTPATIENT)
Dept: PSYCHIATRY | Age: 55
End: 2025-05-29
Payer: COMMERCIAL

## 2025-05-29 DIAGNOSIS — F42.4 SKIN-PICKING DISORDER: ICD-10-CM

## 2025-05-29 DIAGNOSIS — M79.2 NERVE PAIN: ICD-10-CM

## 2025-05-29 DIAGNOSIS — F41.9 ANXIETY: ICD-10-CM

## 2025-05-29 DIAGNOSIS — F33.42 RECURRENT MAJOR DEPRESSIVE DISORDER, IN FULL REMISSION: Primary | ICD-10-CM

## 2025-05-29 PROCEDURE — 99214 OFFICE O/P EST MOD 30 MIN: CPT | Performed by: PSYCHIATRY & NEUROLOGY

## 2025-05-29 PROCEDURE — 90833 PSYTX W PT W E/M 30 MIN: CPT | Performed by: PSYCHIATRY & NEUROLOGY

## 2025-05-29 PROCEDURE — 3017F COLORECTAL CA SCREEN DOC REV: CPT | Performed by: PSYCHIATRY & NEUROLOGY

## 2025-05-29 PROCEDURE — G8427 DOCREV CUR MEDS BY ELIG CLIN: HCPCS | Performed by: PSYCHIATRY & NEUROLOGY

## 2025-05-29 RX ORDER — ACAMPROSATE CALCIUM 333 MG/1
666 TABLET, DELAYED RELEASE ORAL 3 TIMES DAILY
Qty: 180 TABLET | Refills: 1 | Status: SHIPPED | OUTPATIENT
Start: 2025-05-29

## 2025-05-29 RX ORDER — ACAMPROSATE CALCIUM 333 MG/1
666 TABLET, DELAYED RELEASE ORAL 3 TIMES DAILY
Qty: 180 TABLET | Refills: 1 | OUTPATIENT
Start: 2025-05-29 | End: 2025-05-29 | Stop reason: SDUPTHER

## 2025-05-29 RX ORDER — TRAZODONE HYDROCHLORIDE 100 MG/1
200-300 TABLET ORAL NIGHTLY
Qty: 270 TABLET | Refills: 0 | Status: SHIPPED | OUTPATIENT
Start: 2025-05-29

## 2025-05-29 RX ORDER — BUSPIRONE HYDROCHLORIDE 30 MG/1
30 TABLET ORAL 2 TIMES DAILY
Qty: 180 TABLET | Refills: 0 | Status: SHIPPED | OUTPATIENT
Start: 2025-05-29

## 2025-05-29 RX ORDER — VENLAFAXINE HYDROCHLORIDE 150 MG/1
150 CAPSULE, EXTENDED RELEASE ORAL DAILY
Qty: 90 CAPSULE | Refills: 0 | Status: SHIPPED | OUTPATIENT
Start: 2025-05-29

## 2025-05-29 RX ORDER — PREGABALIN 100 MG/1
100 CAPSULE ORAL 3 TIMES DAILY
Qty: 270 CAPSULE | Refills: 0 | Status: SHIPPED | OUTPATIENT
Start: 2025-05-29 | End: 2025-08-27

## 2025-05-29 ASSESSMENT — PATIENT HEALTH QUESTIONNAIRE - PHQ9
1. LITTLE INTEREST OR PLEASURE IN DOING THINGS: NOT AT ALL
7. TROUBLE CONCENTRATING ON THINGS, SUCH AS READING THE NEWSPAPER OR WATCHING TELEVISION: NOT AT ALL
SUM OF ALL RESPONSES TO PHQ QUESTIONS 1-9: 1
8. MOVING OR SPEAKING SO SLOWLY THAT OTHER PEOPLE COULD HAVE NOTICED. OR THE OPPOSITE, BEING SO FIGETY OR RESTLESS THAT YOU HAVE BEEN MOVING AROUND A LOT MORE THAN USUAL: NOT AT ALL
2. FEELING DOWN, DEPRESSED OR HOPELESS: NOT AT ALL
1. LITTLE INTEREST OR PLEASURE IN DOING THINGS: NOT AT ALL
6. FEELING BAD ABOUT YOURSELF - OR THAT YOU ARE A FAILURE OR HAVE LET YOURSELF OR YOUR FAMILY DOWN: NOT AT ALL
9. THOUGHTS THAT YOU WOULD BE BETTER OFF DEAD, OR OF HURTING YOURSELF: NOT AT ALL
8. MOVING OR SPEAKING SO SLOWLY THAT OTHER PEOPLE COULD HAVE NOTICED. OR THE OPPOSITE - BEING SO FIDGETY OR RESTLESS THAT YOU HAVE BEEN MOVING AROUND A LOT MORE THAN USUAL: NOT AT ALL
3. TROUBLE FALLING OR STAYING ASLEEP: SEVERAL DAYS
SUM OF ALL RESPONSES TO PHQ QUESTIONS 1-9: 1
4. FEELING TIRED OR HAVING LITTLE ENERGY: NOT AT ALL
4. FEELING TIRED OR HAVING LITTLE ENERGY: NOT AT ALL
SUM OF ALL RESPONSES TO PHQ QUESTIONS 1-9: 1
5. POOR APPETITE OR OVEREATING: NOT AT ALL
10. IF YOU CHECKED OFF ANY PROBLEMS, HOW DIFFICULT HAVE THESE PROBLEMS MADE IT FOR YOU TO DO YOUR WORK, TAKE CARE OF THINGS AT HOME, OR GET ALONG WITH OTHER PEOPLE: NOT DIFFICULT AT ALL
9. THOUGHTS THAT YOU WOULD BE BETTER OFF DEAD, OR OF HURTING YOURSELF: NOT AT ALL
SUM OF ALL RESPONSES TO PHQ QUESTIONS 1-9: 1
7. TROUBLE CONCENTRATING ON THINGS, SUCH AS READING THE NEWSPAPER OR WATCHING TELEVISION: NOT AT ALL
10. IF YOU CHECKED OFF ANY PROBLEMS, HOW DIFFICULT HAVE THESE PROBLEMS MADE IT FOR YOU TO DO YOUR WORK, TAKE CARE OF THINGS AT HOME, OR GET ALONG WITH OTHER PEOPLE: NOT DIFFICULT AT ALL
3. TROUBLE FALLING OR STAYING ASLEEP: SEVERAL DAYS
2. FEELING DOWN, DEPRESSED OR HOPELESS: NOT AT ALL
6. FEELING BAD ABOUT YOURSELF - OR THAT YOU ARE A FAILURE OR HAVE LET YOURSELF OR YOUR FAMILY DOWN: NOT AT ALL
SUM OF ALL RESPONSES TO PHQ QUESTIONS 1-9: 1
5. POOR APPETITE OR OVEREATING: NOT AT ALL

## 2025-05-29 ASSESSMENT — ANXIETY QUESTIONNAIRES
1. FEELING NERVOUS, ANXIOUS, OR ON EDGE: NOT AT ALL
5. BEING SO RESTLESS THAT IT IS HARD TO SIT STILL: NOT AT ALL
5. BEING SO RESTLESS THAT IT IS HARD TO SIT STILL: NOT AT ALL
GAD7 TOTAL SCORE: 0
IF YOU CHECKED OFF ANY PROBLEMS ON THIS QUESTIONNAIRE, HOW DIFFICULT HAVE THESE PROBLEMS MADE IT FOR YOU TO DO YOUR WORK, TAKE CARE OF THINGS AT HOME, OR GET ALONG WITH OTHER PEOPLE: NOT DIFFICULT AT ALL
2. NOT BEING ABLE TO STOP OR CONTROL WORRYING: NOT AT ALL
7. FEELING AFRAID AS IF SOMETHING AWFUL MIGHT HAPPEN: NOT AT ALL
3. WORRYING TOO MUCH ABOUT DIFFERENT THINGS: NOT AT ALL
3. WORRYING TOO MUCH ABOUT DIFFERENT THINGS: NOT AT ALL
IF YOU CHECKED OFF ANY PROBLEMS ON THIS QUESTIONNAIRE, HOW DIFFICULT HAVE THESE PROBLEMS MADE IT FOR YOU TO DO YOUR WORK, TAKE CARE OF THINGS AT HOME, OR GET ALONG WITH OTHER PEOPLE: NOT DIFFICULT AT ALL
4. TROUBLE RELAXING: NOT AT ALL
6. BECOMING EASILY ANNOYED OR IRRITABLE: NOT AT ALL
4. TROUBLE RELAXING: NOT AT ALL
1. FEELING NERVOUS, ANXIOUS, OR ON EDGE: NOT AT ALL
7. FEELING AFRAID AS IF SOMETHING AWFUL MIGHT HAPPEN: NOT AT ALL
2. NOT BEING ABLE TO STOP OR CONTROL WORRYING: NOT AT ALL
6. BECOMING EASILY ANNOYED OR IRRITABLE: NOT AT ALL

## 2025-05-29 NOTE — PROGRESS NOTES
major depressive disorder, in full remission        2. Anxiety  pregabalin (LYRICA) 100 MG capsule      3. Nerve pain  pregabalin (LYRICA) 100 MG capsule      4. Skin-picking disorder        R/o: OCD, STEVEN, panic d/o, AUD      PLAN:     Medications:   Effexor  mg QHS  Lyrica 200 mg QHS    Increase Lyrica 50 mg QAM to 100 mg QAM  Trazodone 250 mg QHS - didn't tolerate higher dose  Buspar 30 mg BID   Start Campral 666 mg TID - discussed r/b/se/a  Therapy: none, will continue to offer, consider CBTi group  Labs/Tests/Imaging: none   Records Reviewed: [facility]  Patient advised to call if patient has any difficulties with treatment  Return in about 6 weeks (around 7/10/2025) for med check.   I spent 28 minutes face to face with this patient. I spent 16 minutes or longer in psychotherapy discussing current stressors, coping skills, strategies for change, supportive therapy, psycheducation. Remainder of time spent on symptom evaluation and medication management.     Ivanna Mayes, was evaluated through a synchronous (real-time) audio-video encounter. The patient (or guardian if applicable) is aware that this is a billable service, which includes applicable co-pays. This Virtual Visit was conducted with patient's (and/or legal guardian's) consent. Patient identification was verified, and a caregiver was present when appropriate.   The patient was located at Home: [address] [place] [id number]  Provider was located at Home (Appt Dept State): [place]       Total time spent for this encounter:  33 minutes    --Cherie Doe MD on 6/1/2025 at 8:37 AM    An electronic signature was used to authenticate this note.

## 2025-06-12 ENCOUNTER — TELEPHONE (OUTPATIENT)
Dept: PSYCHOLOGY | Age: 55
End: 2025-06-12

## 2025-06-12 NOTE — TELEPHONE ENCOUNTER
Please see previous message and advise. Attached [facility] notes for review below:

## 2025-06-12 NOTE — TELEPHONE ENCOUNTER
Patient called into office regarding progress notes provider from [facility] sent over regarding back surgery and medication changes. Stated that a titration schedule was sent over wanting pregabalin increase to 200 mg in the am and the 200 mg in the pm. Patient stated that she is currently taking 100mg and wants to know when the medication will be increase.

## 2025-06-13 NOTE — TELEPHONE ENCOUNTER
Patient informed and voiced understanding. She is currently taking 100 mg in the morning and 200 mg at night. She will increase to 200 mg BID and keep provider updated.

## 2025-06-13 NOTE — TELEPHONE ENCOUNTER
If Zakiya is currently taking Lyrica 100 mg in morning and 200 mg at night she can increase to 200 mg twice daily.   If the 200 mg dose in daytime is too sedating we will need to reduce dose.   Electronically signed by Cherie Doe MD on 6/13/2025 at 8:32 AM

## 2025-07-10 ENCOUNTER — TELEMEDICINE (OUTPATIENT)
Dept: PSYCHIATRY | Age: 55
End: 2025-07-10

## 2025-07-10 DIAGNOSIS — F33.42 RECURRENT MAJOR DEPRESSIVE DISORDER, IN FULL REMISSION: Primary | ICD-10-CM

## 2025-07-10 DIAGNOSIS — F42.4 SKIN-PICKING DISORDER: ICD-10-CM

## 2025-07-10 DIAGNOSIS — M79.2 NERVE PAIN: ICD-10-CM

## 2025-07-10 DIAGNOSIS — F41.9 ANXIETY: ICD-10-CM

## 2025-07-10 PROCEDURE — 99214 OFFICE O/P EST MOD 30 MIN: CPT | Performed by: PSYCHIATRY & NEUROLOGY

## 2025-07-10 RX ORDER — VENLAFAXINE HYDROCHLORIDE 150 MG/1
150 CAPSULE, EXTENDED RELEASE ORAL DAILY
Qty: 90 CAPSULE | Refills: 0 | Status: SHIPPED | OUTPATIENT
Start: 2025-07-10

## 2025-07-10 RX ORDER — PREGABALIN 200 MG/1
200 CAPSULE ORAL 3 TIMES DAILY
Qty: 270 CAPSULE | Refills: 0 | Status: SHIPPED | OUTPATIENT
Start: 2025-07-10 | End: 2025-10-08

## 2025-07-10 RX ORDER — DISULFIRAM 500 MG/1
500 TABLET ORAL DAILY
Qty: 90 TABLET | Refills: 0 | Status: SHIPPED | OUTPATIENT
Start: 2025-07-10

## 2025-07-10 RX ORDER — TRAZODONE HYDROCHLORIDE 100 MG/1
200-300 TABLET ORAL NIGHTLY
Qty: 270 TABLET | Refills: 0 | Status: SHIPPED | OUTPATIENT
Start: 2025-07-10

## 2025-07-10 RX ORDER — BUSPIRONE HYDROCHLORIDE 30 MG/1
30 TABLET ORAL 2 TIMES DAILY
Qty: 180 TABLET | Refills: 0 | Status: SHIPPED | OUTPATIENT
Start: 2025-07-10

## 2025-07-10 NOTE — PROGRESS NOTES
[facility] PROFESSIONAL SERVS  [facility] PSYCHIATRY  [address] [facility] [address] [phone]    Progress Note    Patient:  Ivanna Mayes  YOB: 1970  PCP:  Jasmin Walters, CLEMENTINA - CNP  Visit Date:  7/10/2025      Chief Complaint   Patient presents with    Follow-up    Medication Check    Depression    Anxiety    Chronic Pain       SUBJECTIVE:      Ivanna Mayes, a 54 y.o.female, presents for a follow up visit.      She presents alone.   Health stressors. \"Surgery was a fail.\"  Ongoing pain. Planning for another fusion surgery maybe on 7/29.  Worried about a 6 hour surgery and a long recovery.   Out of time off at work.   On guidance of pain specialist she is now on Lyrica 300 mg in AM and 200 mg QHS. Felt \"balance issues\", \"loopy\" which is now improved.   No brain fog. Nerve pains ongoing down her leg.   Campral not helping.   Still having 2 drinks per night. We discussed how alcohol is toxic to nerve health and encouraged her to abstain.  Was Rx oxycodone, has only taken one pill.   Discussed tx options and we agree to increase Lyrica further for pain. Will change Campral to Antabuse per her request to help abstain from drinking.   No SI or psychosis.       Med Trials:trazodone, Prozac, hydroxyzine, Lexapro, Neurontin, Lyrica, NAC, Cymbalta, Topamax, Elavil, Xanax, Effexor (benefit), Vistaril (RLS), Buspar      OBJECTIVE:  Vitals: LMP 09/13/2016     MENTAL STATUS EXAM:    GENERAL  Build: Overweight    Hygiene:  Appropriate in casual dress   SENSORIUM Orientation: Place, Person, Time, & Situation     Consciousness: Alert    ATTENTION   Focused    RELATEDNESS  Cooperative    EYE CONTACT   Good    PSYCHOMOTOR  Normal   SPEECH Volume: Normal     Rate: Normal rate and tone    Amplitude: Within normal limits   MOOD  Euthymic   AFFECT Range: Full, mood congruent, smiles   THOUGHT Process:  Goal-Directed     Content: no evidence of psychosis    COGNITION

## 2025-08-01 ENCOUNTER — HOSPITAL ENCOUNTER (OUTPATIENT)
Dept: WOMENS IMAGING | Age: 55
Discharge: HOME | End: 2025-08-01
Attending: RADIOLOGY
Payer: COMMERCIAL

## 2025-08-01 DIAGNOSIS — R59.0 ENLARGED LYMPH NODES IN ARMPIT: ICD-10-CM

## 2025-08-01 DIAGNOSIS — Z09 FOLLOW-UP EXAM: ICD-10-CM

## 2025-08-01 PROCEDURE — 76882 US LMTD JT/FCL EVL NVASC XTR: CPT

## 2025-08-11 ENCOUNTER — LAB (OUTPATIENT)
Dept: LAB | Age: 55
End: 2025-08-11

## 2025-08-11 LAB
ALBUMIN SERPL BCG-MCNC: 4.2 G/DL (ref 3.4–4.9)
ALP SERPL-CCNC: 78 U/L (ref 38–126)
ALT SERPL W/O P-5'-P-CCNC: 22 U/L (ref 10–35)
AST SERPL-CCNC: 27 U/L (ref 10–35)
BILIRUB CONJ SERPL-MCNC: 0.2 MG/DL (ref 0–0.2)
BILIRUB SERPL-MCNC: 0.5 MG/DL (ref 0.3–1.2)
PROT SERPL-MCNC: 7.5 G/DL (ref 6.4–8.3)

## (undated) DEVICE — AGENT HEMSTAT W2XL4IN OXIDIZED REGENERATED CELOS ABSRB SFT

## (undated) DEVICE — SUTURE MCRYL SZ 4-0 L27IN ABSRB UD L19MM PS-2 1/2 CIR PRIM Y426H

## (undated) DEVICE — BASIC SINGLE BASIN BTC-LF: Brand: MEDLINE INDUSTRIES, INC.

## (undated) DEVICE — PACK-MAJOR

## (undated) DEVICE — GOWN,SIRUS,NON REINFRCD,LARGE,SET IN SL: Brand: MEDLINE

## (undated) DEVICE — BLADE ES ELASTOMERIC COAT INSUL DURABLE BEND UPTO 90DEG

## (undated) DEVICE — PACK,UNIVERSAL,NO GOWNS: Brand: MEDLINE

## (undated) DEVICE — JCKSON TBL POSTNER NO HD REST: Brand: MEDLINE INDUSTRIES, INC.

## (undated) DEVICE — SUTURE VCRL SZ 2-0 L18IN ABSRB VLT L26MM SH 1/2 CIR J775D

## (undated) DEVICE — GLOVE ORANGE PI 7 1/2   MSG9075

## (undated) DEVICE — CORD,CAUTERY,BIPOLAR,STERILE: Brand: MEDLINE

## (undated) DEVICE — PAD,NON-ADHERENT,3X8,STERILE,LF,1/PK: Brand: MEDLINE

## (undated) DEVICE — GLOVE SURG SZ 85 L12IN FNGR ORTHO 126MIL CRM LTX FREE

## (undated) DEVICE — DRESSING TRNSPAR W8XL12IN FLM SURESITE 123

## (undated) DEVICE — PACK PROCEDURE SURG SET UP SRMC

## (undated) DEVICE — GLOVE ORANGE PI 7   MSG9070

## (undated) DEVICE — GLOVE SURG SZ 65 THK91MIL LTX FREE SYN POLYISOPRENE

## (undated) DEVICE — SET IRRIG L94IN ID0.281IN W/ 4.5IN DST FLX CONN 2 LD ON OFF

## (undated) DEVICE — GOWN,SIRUS,NONRNF,SETINSLV,XL,20/CS: Brand: MEDLINE

## (undated) DEVICE — GLOVE ORANGE PI 8   MSG9080

## (undated) DEVICE — HEAD POSITIONER, SURGICAL: Brand: DEROYAL

## (undated) DEVICE — 3M™ IOBAN™ 2 ANTIMICROBIAL INCISE DRAPE 6650EZ: Brand: IOBAN™ 2

## (undated) DEVICE — KIT EVAC 400CC PVC RADPQ Y CONN

## (undated) DEVICE — DRAIN SURG 10FR PVC TB W/ TRCR MID PERF NO RESVR HUBLESS

## (undated) DEVICE — SPONGE LAP W18XL18IN WHT COT 4 PLY FLD STRUNG RADPQ DISP ST

## (undated) DEVICE — APPLICATOR MEDICATED 26 CC SOLUTION HI LT ORNG CHLORAPREP

## (undated) DEVICE — BLADE ES L4IN INSUL EDGE

## (undated) DEVICE — PENCIL SMK EVAC ALL IN 1 DSGN ENH VISIBILITY IMPROVED AIR

## (undated) DEVICE — SUTURE VCRL SZ 1 L18IN ABSRB VLT CTX L48MM 1/2 CIR J765D

## (undated) DEVICE — 1010 S-DRAPE TOWEL DRAPE 10/BX: Brand: STERI-DRAPE™

## (undated) DEVICE — ADHESIVE SKIN CLSR 0.7ML TOP DERMBND ADV